# Patient Record
Sex: FEMALE | Race: WHITE | Employment: FULL TIME | URBAN - NONMETROPOLITAN AREA
[De-identification: names, ages, dates, MRNs, and addresses within clinical notes are randomized per-mention and may not be internally consistent; named-entity substitution may affect disease eponyms.]

---

## 2018-02-07 ENCOUNTER — HISTORY (OUTPATIENT)
Dept: RADIOLOGY | Facility: OTHER | Age: 48
End: 2018-02-07

## 2018-02-07 ENCOUNTER — HOSPITAL ENCOUNTER (OUTPATIENT)
Dept: RADIOLOGY | Facility: OTHER | Age: 48
End: 2018-02-07
Attending: PHYSICIAN ASSISTANT

## 2018-02-07 DIAGNOSIS — Z12.31 ENCOUNTER FOR SCREENING MAMMOGRAM FOR MALIGNANT NEOPLASM OF BREAST: ICD-10-CM

## 2018-02-09 ENCOUNTER — OFFICE VISIT - GICH (OUTPATIENT)
Dept: FAMILY MEDICINE | Facility: OTHER | Age: 48
End: 2018-02-09

## 2018-02-09 ENCOUNTER — HISTORY (OUTPATIENT)
Dept: FAMILY MEDICINE | Facility: OTHER | Age: 48
End: 2018-02-09

## 2018-02-09 ENCOUNTER — HOSPITAL ENCOUNTER (OUTPATIENT)
Dept: RADIOLOGY | Facility: OTHER | Age: 48
End: 2018-02-09
Attending: NURSE PRACTITIONER

## 2018-02-09 DIAGNOSIS — R03.0 ELEVATED BLOOD PRESSURE READING WITHOUT DIAGNOSIS OF HYPERTENSION: ICD-10-CM

## 2018-02-09 DIAGNOSIS — R06.02 SHORTNESS OF BREATH: ICD-10-CM

## 2018-02-09 DIAGNOSIS — R51.9 HEADACHE: ICD-10-CM

## 2018-02-09 DIAGNOSIS — F41.9 ANXIETY DISORDER: ICD-10-CM

## 2018-02-09 LAB
ABSOLUTE BASOPHILS - HISTORICAL: 0 THOU/CU MM
ABSOLUTE EOSINOPHILS - HISTORICAL: 0.1 THOU/CU MM
ABSOLUTE IMMATURE GRANULOCYTES(METAS,MYELOS,PROS) - HISTORICAL: 0 THOU/CU MM
ABSOLUTE LYMPHOCYTES - HISTORICAL: 1.4 THOU/CU MM (ref 0.9–2.9)
ABSOLUTE MONOCYTES - HISTORICAL: 0.7 THOU/CU MM
ABSOLUTE NEUTROPHILS - HISTORICAL: 3.1 THOU/CU MM (ref 1.7–7)
ANION GAP - HISTORICAL: 9 (ref 5–18)
BASOPHILS # BLD AUTO: 0.6 %
BUN SERPL-MCNC: 16 MG/DL (ref 7–25)
BUN/CREAT RATIO - HISTORICAL: 24
CALCIUM SERPL-MCNC: 8.7 MG/DL (ref 8.6–10.3)
CHLORIDE SERPLBLD-SCNC: 104 MMOL/L (ref 98–107)
CO2 SERPL-SCNC: 28 MMOL/L (ref 21–31)
CREAT SERPL-MCNC: 0.68 MG/DL (ref 0.7–1.3)
EOSINOPHIL NFR BLD AUTO: 2.4 %
ERYTHROCYTE [DISTWIDTH] IN BLOOD BY AUTOMATED COUNT: 12.2 % (ref 11.5–15.5)
GFR IF NOT AFRICAN AMERICAN - HISTORICAL: >60 ML/MIN/1.73M2
GLUCOSE SERPL-MCNC: 70 MG/DL (ref 70–105)
HCT VFR BLD AUTO: 39.4 % (ref 33–51)
HEMOGLOBIN: 13.7 G/DL (ref 12–16)
IMMATURE GRANULOCYTES(METAS,MYELOS,PROS) - HISTORICAL: 0 %
LYMPHOCYTES NFR BLD AUTO: 26.3 % (ref 20–44)
MCH RBC QN AUTO: 32.9 PG (ref 26–34)
MCHC RBC AUTO-ENTMCNC: 34.8 G/DL (ref 32–36)
MCV RBC AUTO: 95 FL (ref 80–100)
MONOCYTES NFR BLD AUTO: 12.8 %
NEUTROPHILS NFR BLD AUTO: 57.9 % (ref 42–72)
PLATELET # BLD AUTO: 222 THOU/CU MM (ref 140–440)
PMV BLD: 8.6 FL (ref 6.5–11)
POTASSIUM SERPL-SCNC: 3.4 MMOL/L (ref 3.5–5.1)
RED BLOOD COUNT - HISTORICAL: 4.17 MIL/CU MM (ref 4–5.2)
SODIUM SERPL-SCNC: 141 MMOL/L (ref 133–143)
TSH - HISTORICAL: 1.09 UIU/ML (ref 0.34–5.6)
WHITE BLOOD COUNT - HISTORICAL: 5.3 THOU/CU MM (ref 4.5–11)

## 2018-02-09 ASSESSMENT — ANXIETY QUESTIONNAIRES
2. NOT BEING ABLE TO STOP OR CONTROL WORRYING: SEVERAL DAYS
1. FEELING NERVOUS, ANXIOUS, OR ON EDGE: NOT AT ALL
GAD7 TOTAL SCORE: 10
7. FEELING AFRAID AS IF SOMETHING AWFUL MIGHT HAPPEN: MORE THAN HALF THE DAYS
4. TROUBLE RELAXING: NEARLY EVERY DAY
6. BECOMING EASILY ANNOYED OR IRRITABLE: NOT AT ALL
5. BEING SO RESTLESS THAT IT IS HARD TO SIT STILL: NEARLY EVERY DAY
3. WORRYING TOO MUCH ABOUT DIFFERENT THINGS: SEVERAL DAYS

## 2018-02-09 ASSESSMENT — PATIENT HEALTH QUESTIONNAIRE - PHQ9: SUM OF ALL RESPONSES TO PHQ QUESTIONS 1-9: 9

## 2018-02-12 VITALS
SYSTOLIC BLOOD PRESSURE: 134 MMHG | WEIGHT: 142.8 LBS | DIASTOLIC BLOOD PRESSURE: 90 MMHG | OXYGEN SATURATION: 99 % | TEMPERATURE: 98.3 F | HEART RATE: 96 BPM | BODY MASS INDEX: 24.99 KG/M2

## 2018-02-13 ENCOUNTER — NURSE TRIAGE (OUTPATIENT)
Dept: FAMILY MEDICINE | Facility: OTHER | Age: 48
End: 2018-02-13

## 2018-02-13 ENCOUNTER — TELEPHONE (OUTPATIENT)
Dept: FAMILY MEDICINE | Facility: OTHER | Age: 48
End: 2018-02-13

## 2018-02-13 ASSESSMENT — PATIENT HEALTH QUESTIONNAIRE - PHQ9: SUM OF ALL RESPONSES TO PHQ QUESTIONS 1-9: 9

## 2018-02-13 ASSESSMENT — ANXIETY QUESTIONNAIRES: GAD7 TOTAL SCORE: 10

## 2018-02-13 NOTE — PROGRESS NOTES
Patient Information     Patient Name MRN Sex Crystal Soriano [id number] Female 1970      Progress Notes by Sumaya De León R.T. ([facility] at 2018 10:43 AM     Author:  Sumaya De León R.T. ([facility]) Service:  (none) Author Type:  [employer] - Registered Radiologic Technologist     Filed:  2018 10:44 AM Date of Service:  2018 10:43 AM Status:  Signed     :  Sumaya De León R.T. ([facility]) ([facility] - Registered Radiologic Technologist)            Falls Risk Criteria:    Age 65 and older or under age 4        Sensory deficits    Poor vision    Use of ambulatory aides    Impaired judgment    Unable to walk independently    Meets High Risk criteria for falls:  no

## 2018-02-13 NOTE — PATIENT INSTRUCTIONS
Patient Information     Patient Name Crystal Palencia [id number] Female 1970      Patient Instructions by Pua Rashid NP at 2018  2:54 PM     Author:  Pau Rashid NP  Service:  (none) Author Type:  PHYS- Nurse Practitioner     Filed:  2018  2:54 PM  Encounter Date:  2018 Status:  Addendum     :  Pau Rashid NP (PHYS- Nurse Practitioner)        Related Notes: Original Note by Pau Rashid NP (PHYS- Nurse Practitioner) filed at 2018  2:54 PM            Lisinopril daily   Recheck with me in 2 weeks for labs  Paxil daily       Index Portuguese Related topics   Blood Pressure   ________________________________________________________________________  KEY POINTS    Blood pressure is the force of blood against artery walls as the heart pumps blood through the body.    Most people with high blood pressure have no symptoms.    If your blood pressure is too high, your healthcare provider may advise that you lose excess weight, use less salt in your food, be physically active, or take medicine.    If you have low blood pressure that is causing symptoms, do not skip meals, drink plenty of liquids, and stand up slowly after laying down.  ________________________________________________________________________  What is blood pressure?  Blood pressure is the force of blood against artery walls as the heart pumps blood through the body. Many people can improve their blood pressure or prevent high blood pressure with diet changes, more activity, and weight loss if needed. Even if you have a strong family history of high blood pressure, you can improve your blood pressure with a healthy lifestyle.  Blood pressure can go up briefly with exercise, stress, pain, or strong emotions. Drinking alcohol, using some drugs such as cocaine, or taking steroids, NSAIDs, or some cold medicines, can also raise blood pressure.  Blood pressure normally goes down when you are resting, sleeping, or  "feeling calm and relaxed. It can also go down if you are dehydrated and are not drinking enough liquids, such as if you are working or exercising in the hot sun.  How is blood pressure measured?  Most people with high blood pressure have no symptoms. The only way to find out if your blood pressure is normal, too high, or too low is to have it measured. Your healthcare provider measures blood pressure using an inflatable cuff around your upper arm and either a stethoscope or a machine that shows the result.    Low blood pressure usually means blood pressure that is lower than 90/60 or is low enough to cause symptoms. The first, upper number (90 in this example) is the pressure when the heart beats and pushes blood out to the rest of the body. The second, lower number (60 in this example) is the pressure when the heart rests between beats. Low blood pressure is less common than high blood pressure.    Normal resting blood pressure ranges up to 120/80 ( 120 over 80\").    Borderline high blood pressure is 120/80 or higher but less than 140/90.    High blood pressure is 140/90 or higher for most people. If you have diabetes or chronic kidney disease, 130/80 or higher is considered high blood pressure.  Why is high blood pressure a problem?  Over time, if your blood pressure rises and stays high, it can damage your blood vessels, heart, brain, kidneys, and eyes even though you may have no symptoms. The higher your blood pressure is, the more it increases your risk of heart attack, stroke, and other serious medical problems.  If you have high blood pressure, lowering it and keeping it normal can help prevent a heart attack or stroke. Keeping your blood pressure under control can help prevent long-term health problems as well, such as heart failure, kidney failure, and blindness.  How can I keep my blood pressure at the right level?  If your blood pressure is too high, your provider may recommend lifestyle changes to help " you lower your blood pressure, such as:    Lose excess weight. If you are overweight, losing even 10 pounds can lower your blood pressure.    Use less salt (sodium) in your food. Check the levels of sodium listed on food labels. Most of the sodium you eat may be hidden in processed foods such as chips, crackers, canned or boxed foods, fast food, or restaurant food.    Follow a healthy eating plan that is low in saturated fat, cholesterol, and processed foods. Include lots of fruits, vegetables, and fat-free or low-fat milk and milk products. Eat only enough calories to reach or keep a healthy weight. Ask your healthcare provider about how many calories you should eat each day.    Be physically active. Your provider can give you a physical activity plan that tells you what kind of activity and how much is safe for you.    Find ways to relax and to manage stress.    If you smoke, try to quit. Talk to your healthcare provider about ways to quit smoking. Smoking with high blood pressure raises the risk of heart attack and stroke.    If you want to drink alcohol, ask your healthcare provider how much is safe for you to drink.    Be careful with nonprescription medicines or herbal supplements. Some can raise blood pressure. This includes diet pills, cold and pain medicines, and energy drinks. Read labels or ask your pharmacist if the medicine or supplement affects blood pressure.    If lifestyle changes don t lower your blood pressure enough, your healthcare provider may prescribe one or more types of blood pressure medicine. Always follow your healthcare provider's instructions for taking medicine. Don't take more or less medicine or stop taking a medicine without talking to your provider first. It can be dangerous to stop taking certain blood pressure medicines suddenly.  If you have low blood pressure that is causing symptoms, after your healthcare provider has seen you, try these tips:    Don t skip meals. Eat a  healthy diet.    Avoid being out in the heat for a long time or being too active without drinking enough liquids.    Drink plenty of liquids every day, especially in hot weather or when outside.    If you have been lying down, sit for a moment before standing up, and then stand up slowly. Stand a moment before walking. Walk in place briefly while pulling in your stomach muscles several times. (This helps the return of blood flow from the legs.) Ask your healthcare provider if you should wear compression stockings.  If your blood pressure is normal, check it at least once a year. Your provider may recommend checking your blood pressure at home between checkups.  Developed by [employer].  Adult Advisor 2017.4 published by [employer].  Last modified: 2017-05-30  Last reviewed: 2017-05-08  This content is reviewed periodically and is subject to change as new health information becomes available. The information is intended to inform and educate and is not a replacement for medical evaluation, advice, diagnosis or treatment by a healthcare professional.  References   Adult Advisor 2017.4 Index    Copyright   2017 [employer], a division of [employer] All rights reserved.

## 2018-02-13 NOTE — TELEPHONE ENCOUNTER
Per face to face with Pau Rashid, Patient should stop the lisinopril, continue blood pressure monitoring and follow up in 1 week as planned. Attempted reaching Patient to notify her of this information. Left message to call back. Rachel Choudhary RN 2/13/2018 11:36 AM

## 2018-02-13 NOTE — TELEPHONE ENCOUNTER
"  Answer Assessment - Initial Assessment Questions  1. SYMPTOMS: \"Do you have any symptoms?\"  Patient called today stating when she takes her Lisinopril, her lips become slightly swollen and numb and the roof of her mouth feels numb as well.     Patient was prescribed Lisinopril on Friday for high blood pressure and took three times (F, Sa, Turner). She was also prescribed Paxil for anxiety. Patient took medications separately.    2. SEVERITY: If symptoms are present, ask \"Are they mild, moderate or severe?\"  Patient did not feel that symptoms were severe enough to take benadryl in case of allergy, but did not take the medication today. Patient is nervous about mixing ANY medications.    Rachel Choudhary RN 2/13/18 10:37 AM    Protocols used: MEDICATION QUESTION CALL-ADULT-    "

## 2018-02-13 NOTE — PROGRESS NOTES
Patient Information     Patient Name Crystal Palencia [id number] Female 1970      Progress Notes by Pau Rashid NP at 2018  1:30 PM     Author:  Pau Rashid NP Service:  (none) Author Type:  PHYS- Nurse Practitioner     Filed:  2018  4:16 PM Encounter Date:  2018 Status:  Signed     :  Pau Rashid NP (PHYS- Nurse Practitioner)            HPI:    Crystal Ferrara is a 47 y.o. female who presents to clinic today for blood pressure concerns. Reports she has been checking blood pressure at home and this has been running high. Reports readings up to 198/112. She has been checking blood pressures several times daily at times, usually in morning and at night. No hx of blood pressure. Feels this is causing headaches and SOB. She has had some pains in chest. Mother has HTN. Hx of smoking. She has changed some diet intake, avoiding fast food. Does have things like sausage, cottage cheese. She is doing exercise and cardio, approx 4 times a week. Reports her blood pressure concerns have been present for the past month. Hx of uterine ablation, no menses.     Having a lot of anxiety. She has a lot of stress at home. She is , when kids are gone she has increased anxiety. Her kids are 10, 11, 20.  for 7 years. No hx of medications for anxiety. She feels she is talking fast. Her co-workers have noticed this as well. Family hx of thyroid disease.     Past Medical History:     Diagnosis  Date     Heavy menses 2013     History of pregnancy      .      History of vaginal delivery      Past Surgical History:      Procedure  Laterality Date     APPENDECTOMY       TONSILLECTOMY       Social History        Substance Use Topics          Smoking status:   Former Smoker      Packs/day:  0.25      Years:  8.00      Types:  Cigarettes      Quit date:  2016      Smokeless tobacco:   Never Used      Alcohol use   Yes      Comment: once weekly       No current  outpatient prescriptions on file.     No current facility-administered medications for this visit.      Medications have been reviewed by me and are current to the best of my knowledge and ability.    Allergies     Allergen  Reactions     Amoxicillin Hives       ROS:  Pertinent positives and negatives are noted in HPI.    EXAM:  General appearance: well appearing female, in no acute distress  Head: normocephalic, atraumatic  Ears: TM's with cone of light, no erythema, canals clear bilaterally  Eyes: conjunctivae normal  Orophayrnx: moist mucous membranes, tonsils without erythema, exudates or petechiae, no post nasal drip seen  Neck: supple without adenopathy  Respiratory: clear to auscultation bilaterally  Cardiac: RRR with no murmurs  Psychological: normal affect, alert and pleasant  DANIA-7 ANXIETY SCREENING 2/9/2018   DANIA date (doc flow) 2/9/2018   Nervous, anxious 0   Cannot stop worrying 1   Worry about different things 1   Cannot relax 3   Feeling restless 3   Easily annoyed/irritated 0   Afraid of awful event 2   Score 10   Severity moderate anxiety   Some recent data might be hidden      EKG: NSR  CXR: normal  Lab:   Results for orders placed or performed in visit on 02/09/18      BASIC METABOLIC PANEL      Result  Value Ref Range    SODIUM 141 133 - 143 mmol/L    POTASSIUM 3.4 (L) 3.5 - 5.1 mmol/L    CHLORIDE 104 98 - 107 mmol/L    CO2,TOTAL 28 21 - 31 mmol/L    ANION GAP 9 5 - 18                    GLUCOSE 70 70 - 105 mg/dL    CALCIUM 8.7 8.6 - 10.3 mg/dL    BUN 16 7 - 25 mg/dL    CREATININE 0.68 (L) 0.70 - 1.30 mg/dL    BUN/CREAT RATIO           24                    GFR if African American >60 >60 ml/min/1.73m2    GFR if not African American >60 >60 ml/min/1.73m2   CBC WITH AUTO DIFFERENTIAL      Result  Value Ref Range    WHITE BLOOD COUNT         5.3 4.5 - 11.0 thou/cu mm    RED BLOOD COUNT           4.17 4.00 - 5.20 mil/cu mm    HEMOGLOBIN                13.7 12.0 - 16.0 g/dL    HEMATOCRIT                 39.4 33.0 - 51.0 %    MCV                       95 80 - 100 fL    MCH                       32.9 26.0 - 34.0 pg    MCHC                      34.8 32.0 - 36.0 g/dL    RDW                       12.2 11.5 - 15.5 %    PLATELET COUNT            222 140 - 440 thou/cu mm    MPV                       8.6 6.5 - 11.0 fL    NEUTROPHILS               57.9 42.0 - 72.0 %    LYMPHOCYTES               26.3 20.0 - 44.0 %    MONOCYTES                 12.8 (H) <12.0 %    EOSINOPHILS               2.4 <8.0 %    BASOPHILS                 0.6 <3.0 %    IMMATURE GRANULOCYTES(METAS,MYELOS,PROS) 0.0 %    ABSOLUTE NEUTROPHILS      3.1 1.7 - 7.0 thou/cu mm    ABSOLUTE LYMPHOCYTES      1.4 0.9 - 2.9 thou/cu mm    ABSOLUTE MONOCYTES        0.7 <0.9 thou/cu mm    ABSOLUTE EOSINOPHILS      0.1 <0.5 thou/cu mm    ABSOLUTE BASOPHILS        0.0 <0.3 thou/cu mm    ABSOLUTE IMMATURE GRANULOCYTES(METAS,MYELOS,PROS) 0.0 <=0.3 thou/cu mm         ASSESSMENT/PLAN:    ICD-10-CM    1. Nonintractable headache, unspecified chronicity pattern, unspecified headache type R51 BASIC METABOLIC PANEL      EKG 12 LEAD UNIT PERFORMED      XR CHEST 2 VIEWS PA AND LATERAL      CBC WITH DIFFERENTIAL      BASIC METABOLIC PANEL      CBC WITH DIFFERENTIAL      CBC WITH AUTO DIFFERENTIAL   2. Elevated blood pressure reading R03.0 BASIC METABOLIC PANEL      EKG 12 LEAD UNIT PERFORMED      XR CHEST 2 VIEWS PA AND LATERAL      CBC WITH DIFFERENTIAL      BASIC METABOLIC PANEL      CBC WITH DIFFERENTIAL      CBC WITH AUTO DIFFERENTIAL      NM ELECTROCARDIOGRAM TRACING      lisinopril (PRINIVIL; ZESTRIL) 10 mg tablet   3. Shortness of breath R06.02 BASIC METABOLIC PANEL      EKG 12 LEAD UNIT PERFORMED      XR CHEST 2 VIEWS PA AND LATERAL      CBC WITH DIFFERENTIAL      BASIC METABOLIC PANEL      CBC WITH DIFFERENTIAL      CBC WITH AUTO DIFFERENTIAL   4. Anxiety F41.9 PARoxetine (PAXIL) 10 mg tablet      TSH      TSH   BMP and CBC stable. CXR and EKG normal. TSH pending.  Lisinopril 10 mg daily. Will monitor BP at home 3-4 times weekly and f/u in 2 weeks for labs. Started on Paxil for anxiety. Discussed side effects which include but are not limited to headache, stomachache, sleep disturbance, appetite suppression, emotional lability. Also discussed black box warning on all SSRI medication for increased thoughts of suicidality or self harm in the initial phase of treatment. If any thoughts of self harm/suicide, family is asked to seek medical care or call 911 or First Call for Help/Crisis Team for evaluation.  Follow up for any new or concerning side effects or any other questions.     Patient Instructions   Lisinopril daily   Recheck with me in 2 weeks for labs  Paxil daily       Index [place]ian Related topics   Blood Pressure   ________________________________________________________________________  KEY POINTS    Blood pressure is the force of blood against artery walls as the heart pumps blood through the body.    Most people with high blood pressure have no symptoms.    If your blood pressure is too high, yourhealthcare provider may advise that you lose excess weight, use less salt in your food, be physically active, or take medicine.    If you have low blood pressure that is causing symptoms, do not skip meals, drink plenty of liquids, and stand up slowly after laying down.  ________________________________________________________________________  What is blood pressure?  Blood pressure is the force of blood against artery walls as the heart pumps blood through the body. Many people can improve their blood pressure or prevent high blood pressure with diet changes, more activity, and weight loss if needed. Even if you have a strong family history of high blood pressure, you can improve your blood pressure with a healthy lifestyle.  Blood pressure can go up briefly with exercise, stress, pain, or strong emotions. Drinking alcohol, using some drugs such as cocaine, or taking  "steroids, NSAIDs, or some cold medicines, can also raise blood pressure.  Blood pressure normally goes down when you are resting, sleeping, or feeling calm and relaxed. It can also go down if you are dehydrated and are not drinking enough liquids, such as if you are working or exercising in the hot sun.  How is blood pressure measured?  Most people with high blood pressure have no symptoms. The only way to find out if your blood pressure is normal, too high, or too low is to have it measured. Your healthcare provider measures blood pressure using an inflatable cuff around your upper arm and either a stethoscope or a machine that shows the result.    Low blood pressure usually means blood pressure that is lower than 90/60 or is low enough to cause symptoms. The first, upper number (90 in this example) is the pressure when the heart beats and pushes blood out to the rest of the body. The second, lower number (60 in this example) is the pressure when the heart rests between beats. Low blood pressure is less common than high blood pressure.    Normal resting blood pressure ranges up to 120/80 ( 120 over 80\").    Borderline high blood pressure is 120/80 or higher but less than 140/90.    High blood pressure is 140/90 or higher for most people. If you have diabetes or chronic kidney disease, 130/80 or higher is considered high blood pressure.  Why is high blood pressure a problem?  Over time, if your blood pressure rises and stays high, it can damage your blood vessels, heart, brain, kidneys, and eyes even though you may have no symptoms. The higher your blood pressure is, the more it increases your risk of heart attack, stroke, and other serious medical problems.  If you have high blood pressure, lowering it and keeping it normal can help prevent a heart attack or stroke. Keeping your blood pressure under control can help prevent long-term health problems as well, such as heart failure, kidney failure, and " blindness.  How can I keep my blood pressure at the right level?  If your blood pressure is too high, your provider may recommend lifestyle changes to help you lower your blood pressure, such as:    Lose excess weight. If you are overweight, losing even 10 pounds can lower your blood pressure.    Use less salt (sodium) in your food. Check the levels of sodium listed on food labels. Most of the sodium you eat may be hidden in processed foods such as chips, crackers, canned or boxed foods, fast food, or restaurant food.    Follow a healthy eating plan that is low in saturated fat, cholesterol, and processed foods. Include lots of fruits, vegetables, and fat-free or low-fat milk and milk products. Eat only enough calories to reach or keep a healthy weight. Ask your healthcare provider about how many calories you should eat each day.    Be physically active. Your provider can give you a physical activity plan that tells you what kind of activity and how much is safe for you.    Find ways to relax and to manage stress.    If you smoke, try to quit. Talk to your healthcare provider about ways to quit smoking. Smoking with high blood pressure raises the risk of heart attack and stroke.    If you want to drink alcohol, ask your healthcare provider how much is safe for you to drink.    Be careful with nonprescription medicines or herbal supplements. Some can raise blood pressure. This includes diet pills, cold and pain medicines, and energy drinks. Read labels or ask your pharmacist if the medicine or supplement affects blood pressure.    If lifestyle changes don t lower your blood pressure enough, your healthcare provider may prescribe one or more types of blood pressure medicine. Always follow your healthcare provider's instructions for taking medicine. Don't take more or less medicine or stop taking a medicine without talking to your provider first. It can be dangerous to stop taking certain blood pressure medicines  suddenly.  If you have low blood pressure that is causing symptoms, after your healthcare provider has seen you, try these tips:    Don t skip meals. Eat a healthy diet.    Avoid being out in the heat for a long time or being too active without drinking enough liquids.    Drink plenty of liquids every day, especially in hot weather or when outside.    If you have been lying down, sit for a moment before standing up, and then stand up slowly. Stand a moment before walking. Walk in place briefly while pulling in your stomach muscles several times. (This helps the return of blood flow from the legs.) Ask your healthcare provider if you should wear compression stockings.  If your blood pressure is normal, check it at least once a year. Your provider may recommend checking your blood pressure at home between checkups.  Developed by [employer].  Adult Advisor 2017.4 published by [employer].  Last modified: 2017-05-30  Last reviewed: 2017-05-08  This content is reviewed periodically and is subject to change as new health information becomes available. The information is intended to inform and educate and is not a replacement for medical evaluation, advice, diagnosis or treatment by a healthcare professional.  References   Adult Advisor 2017.4 Index    Copyright   2017 [employer], a division of [employer] All rights reserved.

## 2018-02-13 NOTE — NURSING NOTE
Patient Information     Patient Name MRN Crystal Bhatt [id number] Female 1970      Nursing Note by Key Stubbs at 2018  1:30 PM     Author:  Key Stubbs Service:  (none) Author Type:  (none)     Filed:  2018  1:47 PM Encounter Date:  2018 Status:  Signed     :  Key Stubbs            Patient states that her blood pressure is high.  Has been checking at home  Causing headaches and shortness breath  Key Stubbs LPN 2018 1:41 PM

## 2018-02-21 ENCOUNTER — DOCUMENTATION ONLY (OUTPATIENT)
Dept: FAMILY MEDICINE | Facility: OTHER | Age: 48
End: 2018-02-21

## 2018-02-21 RX ORDER — ALBUTEROL SULFATE 90 UG/1
2 AEROSOL, METERED RESPIRATORY (INHALATION) EVERY 4 HOURS PRN
COMMUNITY
Start: 2016-12-30 | End: 2018-02-23

## 2018-02-21 RX ORDER — AZITHROMYCIN 250 MG/1
TABLET, FILM COATED ORAL
COMMUNITY
Start: 2016-12-30 | End: 2018-02-23

## 2018-02-23 ENCOUNTER — OFFICE VISIT (OUTPATIENT)
Dept: FAMILY MEDICINE | Facility: OTHER | Age: 48
End: 2018-02-23
Attending: NURSE PRACTITIONER
Payer: COMMERCIAL

## 2018-02-23 VITALS
SYSTOLIC BLOOD PRESSURE: 152 MMHG | WEIGHT: 144 LBS | HEART RATE: 88 BPM | BODY MASS INDEX: 25.2 KG/M2 | DIASTOLIC BLOOD PRESSURE: 94 MMHG

## 2018-02-23 DIAGNOSIS — F41.9 ANXIETY: Primary | ICD-10-CM

## 2018-02-23 DIAGNOSIS — I10 HYPERTENSION, UNSPECIFIED TYPE: ICD-10-CM

## 2018-02-23 PROCEDURE — 99214 OFFICE O/P EST MOD 30 MIN: CPT | Performed by: NURSE PRACTITIONER

## 2018-02-23 RX ORDER — PAROXETINE 10 MG/1
10 TABLET, FILM COATED ORAL DAILY
COMMUNITY
Start: 2018-02-09 | End: 2018-03-23

## 2018-02-23 RX ORDER — PAROXETINE 20 MG/1
20 TABLET, FILM COATED ORAL AT BEDTIME
Qty: 30 TABLET | Refills: 1 | Status: SHIPPED | OUTPATIENT
Start: 2018-02-23 | End: 2018-03-27

## 2018-02-23 RX ORDER — CHLORTHALIDONE 25 MG/1
25 TABLET ORAL DAILY
Qty: 30 TABLET | Refills: 1 | Status: SHIPPED | OUTPATIENT
Start: 2018-02-23 | End: 2018-03-13

## 2018-02-23 ASSESSMENT — ANXIETY QUESTIONNAIRES
1. FEELING NERVOUS, ANXIOUS, OR ON EDGE: NOT AT ALL
3. WORRYING TOO MUCH ABOUT DIFFERENT THINGS: SEVERAL DAYS
IF YOU CHECKED OFF ANY PROBLEMS ON THIS QUESTIONNAIRE, HOW DIFFICULT HAVE THESE PROBLEMS MADE IT FOR YOU TO DO YOUR WORK, TAKE CARE OF THINGS AT HOME, OR GET ALONG WITH OTHER PEOPLE: VERY DIFFICULT
6. BECOMING EASILY ANNOYED OR IRRITABLE: SEVERAL DAYS
2. NOT BEING ABLE TO STOP OR CONTROL WORRYING: MORE THAN HALF THE DAYS
5. BEING SO RESTLESS THAT IT IS HARD TO SIT STILL: NEARLY EVERY DAY
7. FEELING AFRAID AS IF SOMETHING AWFUL MIGHT HAPPEN: NOT AT ALL
GAD7 TOTAL SCORE: 10

## 2018-02-23 ASSESSMENT — PATIENT HEALTH QUESTIONNAIRE - PHQ9: 5. POOR APPETITE OR OVEREATING: NEARLY EVERY DAY

## 2018-02-23 ASSESSMENT — PAIN SCALES - GENERAL: PAINLEVEL: NO PAIN (0)

## 2018-02-23 NOTE — PATIENT INSTRUCTIONS
Increase paxil to 20 mg daily  Chlorthalidone 25 mg daily  Continue to monitor your blood pressure a couple times weekly  Return in 1 month for recheck and lab work        Controlling High Blood Pressure  High blood pressure (hypertension) is often called the silent killer. This is because many people who have it don t know it. High blood pressure is defined as 140/90 mm Hg or higher. Know your blood pressure and remember to check it regularly. Doing so can save your life. Here are some things you can do to help control your blood pressure.    Choose heart-healthy foods    Select low-salt, low-fat foods. Limit sodium intake to 2,400 mg per day or the amount suggested by your healthcare provider.    Limit canned, dried, cured, packaged, and fast foods. These can contain a lot of salt.    Eat 8 to 10 servings of fruits and vegetables every day.    Choose lean meats, fish, or chicken.    Eat whole-grain pasta, brown rice, and beans.    Eat 2 to 3 servings of low-fat or fat-free dairy products.    Ask your doctor about the DASH eating plan. This plan helps reduce blood pressure.    When you go to a restaurant, ask that your meal be prepared with no added salt.  Maintain a healthy weight    Ask your healthcare provider how many calories to eat a day. Then stick to that number.    Ask your healthcare provider what weight range is healthiest for you. If you are overweight, a weight loss of only 3% to 5% of your body weight can help lower blood pressure. Generally, a good weight loss goal is to lose 10% of your body weight in a year.    Limit snacks and sweets.    Get regular exercise.  Get up and get active    Choose activities you enjoy. Find ones you can do with friends or family. This includes bicycling, dancing, walking, and jogging.    Park farther away from building entrances.    Use stairs instead of the elevator.    When you can, walk or bike instead of driving.    [place] leaves, garden, or do household  repairs.    Be active at a moderate to vigorous level of physical activity for at least 40 minutes for a minimum of 3 to 4 days a week.   Manage stress    Make time to relax and enjoy life. Find time to laugh.    Communicate your concerns with your loved ones and your healthcare provider.    Visit with family and friends, and keep up with hobbies.  Limit alcohol and quit smoking    Men should have no more than 2 drinks per day.    Women should have no more than 1 drink per day.    Talk with your healthcare provider about quitting smoking. Smoking significantly increases your risk for heart disease and stroke. Ask your healthcare provider about community smoking cessation programs and other options.  Medicines  If lifestyle changes aren t enough, your healthcare provider may prescribe high blood pressure medicine. Take all medicines as prescribed. If you have any questions about your medicines, ask your healthcare provider before stopping or changing them.   Date Last Reviewed: 4/27/2016 2000-2017 [employer]. [address] [place] [address], [place] [address]. All rights reserved. This information is not intended as a substitute for professional medical care. Always follow your healthcare professional's instructions.

## 2018-02-23 NOTE — NURSING NOTE
Patient presents to clinic today for a follow up on blood pressure and anxiety.    Kathy Diego LPN...................2/23/2018  10:07 AM

## 2018-02-23 NOTE — MR AVS SNAPSHOT
After Visit Summary   2/23/2018    Crystal Ferrara    MRN: [id number]           Patient Information     Date Of Birth          1970        Visit Information        Provider Department      2/23/2018 10:00 AM Pau Rashid APRN CNP [facility]        Today's Diagnoses     Anxiety    -  1    Hypertension, unspecified type          Care Instructions    Increase paxil to 20 mg daily  Chlorthalidone 25 mg daily  Continue to monitor your blood pressure a couple times weekly  Return in 1 month for recheck and lab work        Controlling High Blood Pressure  High blood pressure (hypertension) is often called the silent killer. This is because many people who have it don t know it. High blood pressure is defined as 140/90 mm Hg or higher. Know your blood pressure and remember to check it regularly. Doing so can save your life. Here are some things you can do to help control your blood pressure.    Choose heart-healthy foods    Select low-salt, low-fat foods. Limit sodium intake to 2,400 mg per day or the amount suggested by your healthcare provider.    Limit canned, dried, cured, packaged, and fast foods. These can contain a lot of salt.    Eat 8 to 10 servings of fruits and vegetables every day.    Choose lean meats, fish, or chicken.    Eat whole-grain pasta, brown rice, and beans.    Eat 2 to 3 servings of low-fat or fat-free dairy products.    Ask your doctor about the DASH eating plan. This plan helps reduce blood pressure.    When you go to a restaurant, ask that your meal be prepared with no added salt.  Maintain a healthy weight    Ask your healthcare provider how many calories to eat a day. Then stick to that number.    Ask your healthcare provider what weight range is healthiest for you. If you are overweight, a weight loss of only 3% to 5% of your body weight can help lower blood pressure. Generally, a good weight loss goal is to lose 10% of your body weight in a  year.    Limit snacks and sweets.    Get regular exercise.  Get up and get active    Choose activities you enjoy. Find ones you can do with friends or family. This includes bicycling, dancing, walking, and jogging.    Park farther away from building entrances.    Use stairs instead of the elevator.    When you can, walk or bike instead of driving.    [place] leaves, garden, or do household repairs.    Be active at a moderate to vigorous level of physical activity for at least 40 minutes for a minimum of 3 to 4 days a week.   Manage stress    Make time to relax and enjoy life. Find time to laugh.    Communicate your concerns with your loved ones and your healthcare provider.    Visit with family and friends, and keep up with hobbies.  Limit alcohol and quit smoking    Men should have no more than 2 drinks per day.    Women should have no more than 1 drink per day.    Talk with your healthcare provider about quitting smoking. Smoking significantly increases your risk for heart disease and stroke. Ask your healthcare provider about community smoking cessation programs and other options.  Medicines  If lifestyle changes aren t enough, your healthcare provider may prescribe high blood pressure medicine. Take all medicines as prescribed. If you have any questions about your medicines, ask your healthcare provider before stopping or changing them.   Date Last Reviewed: 4/27/2016 2000-2017 [employer]. [address] [place] [address], [place] [address]. All rights reserved. This information is not intended as a substitute for professional medical care. Always follow your healthcare professional's instructions.                Follow-ups after your visit        Future tests that were ordered for you today     Open Future Orders        Priority Expected Expires Ordered    Basic metabolic panel  (Ca, Cl, CO2, Creat, Gluc, K, Na, BUN) Routine 3/18/2018 2/23/2019 2/23/2018            Who to contact     If you have questions  "or need follow up information about today's clinic visit or your schedule please contact [facility] directly at [phone].  Normal or non-critical lab and imaging results will be communicated to you by My [employer], letter or phone within 4 business days after the clinic has received the results. If you do not hear from us within 7 days, please contact the clinic through [employer] or phone. If you have a critical or abnormal lab result, we will notify you by phone as soon as possible.  Submit refill requests through [employer] or call your pharmacy and they will forward the refill request to us. Please allow 3 business days for your refill to be completed.          Additional Information About Your Visit        My Health [employer]EPIS Information     [employer] lets you send messages to your doctor, view your test results, renew your prescriptions, schedule appointments and more. To sign up, go to [email or web address] . Click on \"Log in\" on the left side of the screen, which will take you to the Welcome page. Then click on \"Sign up Now\" on the right side of the page.     You will be asked to enter the access code listed below, as well as some personal information. Please follow the directions to create your username and password.     Your access code is: [email or web address]  Expires: 2018 10:24 AM     Your access code will  in 90 days. If you need help or a new code, please call your [place] clinic or [phone].        Care EveryWhere ID     This is your Care EveryWhere ID. This could be used by other organizations to access your [place] medical records  [id number]        Your Vitals Were     Pulse Breastfeeding? BMI (Body Mass Index)             88 No 25.2 kg/m2          Blood Pressure from Last 3 Encounters:   18 (!) 152/94   18 134/90   16 138/80    Weight from Last 3 Encounters:   18 144 lb (65.3 kg)   18 142 lb 12.8 oz (64.8 kg)   16 130 lb 4 oz (59.1 kg)            "      Today's Medication Changes          These changes are accurate as of 2/23/18 10:24 AM.  If you have any questions, ask your nurse or doctor.               Start taking these medicines.        Dose/Directions    chlorthalidone 25 MG tablet   Commonly known as:  HYGROTON   Used for:  Hypertension, unspecified type   Started by:  Pau Rashid APRN CNP        Dose:  25 mg   Take 1 tablet (25 mg) by mouth daily   Quantity:  30 tablet   Refills:  1         These medicines have changed or have updated prescriptions.        Dose/Directions    * PARoxetine 10 MG tablet   Commonly known as:  PAXIL   This may have changed:  Another medication with the same name was added. Make sure you understand how and when to take each.   Changed by:  Pau Rashid APRN CNP        Dose:  10 mg   Take 10 mg by mouth daily   Refills:  0       * PARoxetine 20 MG tablet   Commonly known as:  PAXIL   This may have changed:  You were already taking a medication with the same name, and this prescription was added. Make sure you understand how and when to take each.   Used for:  Anxiety   Changed by:  Pau Rashid APRN CNP        Dose:  20 mg   Take 1 tablet (20 mg) by mouth At Bedtime   Quantity:  30 tablet   Refills:  1       * Notice:  This list has 2 medication(s) that are the same as other medications prescribed for you. Read the directions carefully, and ask your doctor or other care provider to review them with you.         Where to get your medicines      These medications were sent to [facility] [address] [place], [place] - [address]  [address], [facility] [address]     Phone:  [phone]     chlorthalidone 25 MG tablet    PARoxetine 20 MG tablet                Primary Care Provider Fax #    Physician No Ref-Primary [phone]       No address on file        Equal Access to Services     MAXINE ORTEZ AH: shannan Hammond, nicolasa bernal  adalgisa escobararaseli gomez'aan ah. [facility] [phone].    ATENCIÓN: Si allison rouse, tiene a martinez disposición servicios gratuitos de asistencia lingüística. Blayne al [phone].    We comply with applicable federal civil rights laws and [place] laws. We do not discriminate on the basis of race, color, national origin, age, disability, sex, sexual orientation, or gender identity.            Thank you!     Thank you for choosing [facility]  for your care. Our goal is always to provide you with excellent care. Hearing back from our patients is one way we can continue to improve our services. Please take a few minutes to complete the written survey that you may receive in the mail after your visit with us. Thank you!             Your Updated Medication List - Protect others around you: Learn how to safely use, store and throw away your medicines at www.disposemymeds.org.          This list is accurate as of 2/23/18 10:24 AM.  Always use your most recent med list.                   Brand Name Dispense Instructions for use Diagnosis    chlorthalidone 25 MG tablet    HYGROTON    30 tablet    Take 1 tablet (25 mg) by mouth daily    Hypertension, unspecified type       * PARoxetine 10 MG tablet    PAXIL     Take 10 mg by mouth daily        * PARoxetine 20 MG tablet    PAXIL    30 tablet    Take 1 tablet (20 mg) by mouth At Bedtime    Anxiety       * Notice:  This list has 2 medication(s) that are the same as other medications prescribed for you. Read the directions carefully, and ask your doctor or other care provider to review them with you.

## 2018-02-24 ASSESSMENT — ANXIETY QUESTIONNAIRES: GAD7 TOTAL SCORE: 10

## 2018-02-24 ASSESSMENT — PATIENT HEALTH QUESTIONNAIRE - PHQ9: SUM OF ALL RESPONSES TO PHQ QUESTIONS 1-9: 8

## 2018-02-27 ENCOUNTER — DOCUMENTATION ONLY (OUTPATIENT)
Dept: FAMILY MEDICINE | Facility: OTHER | Age: 48
End: 2018-02-27

## 2018-03-05 ENCOUNTER — OFFICE VISIT (OUTPATIENT)
Dept: FAMILY MEDICINE | Facility: OTHER | Age: 48
End: 2018-03-05
Attending: NURSE PRACTITIONER
Payer: COMMERCIAL

## 2018-03-05 ENCOUNTER — TELEPHONE (OUTPATIENT)
Dept: FAMILY MEDICINE | Facility: OTHER | Age: 48
End: 2018-03-05

## 2018-03-05 VITALS
SYSTOLIC BLOOD PRESSURE: 102 MMHG | WEIGHT: 141.38 LBS | DIASTOLIC BLOOD PRESSURE: 60 MMHG | BODY MASS INDEX: 24.74 KG/M2 | HEART RATE: 100 BPM | TEMPERATURE: 97.6 F

## 2018-03-05 DIAGNOSIS — I10 ESSENTIAL HYPERTENSION: ICD-10-CM

## 2018-03-05 DIAGNOSIS — F41.9 ANXIETY: ICD-10-CM

## 2018-03-05 DIAGNOSIS — K12.0 CANKER SORES ORAL: Primary | ICD-10-CM

## 2018-03-05 PROCEDURE — 99214 OFFICE O/P EST MOD 30 MIN: CPT | Performed by: NURSE PRACTITIONER

## 2018-03-05 ASSESSMENT — PAIN SCALES - GENERAL: PAINLEVEL: EXTREME PAIN (9)

## 2018-03-05 NOTE — TELEPHONE ENCOUNTER
Notified patient of providers note. Sent to scheduling  Page Esparza CMA..............3/5/2018........8:58 AM

## 2018-03-05 NOTE — PATIENT INSTRUCTIONS
Understanding Canker Sores  Canker sores are small, painful sores inside the mouth. They occur most often on the tongue, gums, or insides of the cheeks. The medical term for canker sores is aphthous ulcers.  What causes a canker sore?  The exact cause of canker sores is not known, but they are linked to a number of conditions. These include:    An injury or irritation in the mouth, such as biting the inside of your cheek or braces rubbing    Allergy or sensitivity to certain foods or substances, such as citrus juice or some kinds of toothpaste    Poor nutrition    Emotional stress    Certain infections and illnesses  Canker sores tend to run in families.  What are the symptoms of a canker sore?  These are some common traits of canker sores:    Sores are open and grayish-yellow, surrounded by redness.    Sores are usually painful and sensitive to touch.    Canker sores may be preceded by a burning or tingling sensation a few hours to a few days before the sore appears.    Children and teens tend to get canker sores more often than adults.  How are canker sores treated?  Canker sores usually go away by themselves within 10 to 14 days. There is no cure for canker sores. Treatment focuses on relieving symptoms and shortening outbreaks. Treatments may include:    Prescription or over-the-counter skin treatments to apply to the sores. Steroids for your skin (topical) may protect the canker sores from further irritation and allow them to heal. Topical pain relief medicines may numb the area and make the sores less painful.    Certain types of toothpaste. These do not contain sodium lauryl sulfate. This type of toothpaste may prevent further aggravation of canker sores.    Oral prescription medicines. These are used for severe cases to help relieve symptoms.    Prescription or over-the-counter pain medicines. These help with discomfort.  What are the complications of a canker sore?  Mouth sores that seem to be canker  sores can be signs of a more serious illness. If you have other signs of illness along with mouth sores, you should talk with a healthcare provider. Canker sores can be so painful that they interfere with talking, eating, or drinking.  When should I call my healthcare provider?  Call your healthcare provider right away if you have any of these:    Canker sores that don t go away after 2 weeks    Canker sores that come back more than 3 times a year    Canker sores that are larger than about a half-inch across    Fever of 100.4 F (38 C) or higher, or as directed    Pain that gets worse    You aren t able to eat or drink because of painful sores    Symptoms that don t get better, or symptoms that get worse    New symptoms   Date Last Reviewed: 5/1/2016 2000-2017 [employer]. [address] [place] [address], [place] [address]. All rights reserved. This information is not intended as a substitute for professional medical care. Always follow your healthcare professional's instructions.

## 2018-03-05 NOTE — PROGRESS NOTES
HPI:    Crystal Ferrara is a 47 year old female who presents to clinic today for concerns of mouth sores. She was started on lisinopril and reports having swelling in her mouth so this was stopped and changed to chlorthalidone. This was started 2018. Calls to clinic today with concerns of mouth sores, worried this is an allergic reaction to the chlorthalidone. The mouth sores started 4 days ago. She was biting her cheeks on Friday and these have gone away. She reports her tongue and lip has sores to mouth. These sores are painful. Has been swishing with warm salt water. No recent cold sx, did note a cough during the night. She does have anxiety, being treated with Paxil 20 mg daily. Feels the paxil is helping her anxiety.     Past Medical History:   Diagnosis Date     Excessive and frequent menstruation with regular cycle     2013     Personal history of other medical treatment (CODE)     .     Personal history of other medical treatment (CODE)     No Comments Provided       Past Surgical History:   Procedure Laterality Date     APPENDECTOMY OPEN      No Comments Provided     TONSILLECTOMY      No Comments Provided       Current Outpatient Prescriptions   Medication Sig Dispense Refill     lidocaine, viscous, (XYLOCAINE) 2 % solution Take 15 mLs by mouth every 2 hours as needed for moderate pain swish and spit every 3-8 hours as needed; max 8 doses/24 hour period 100 mL 1     PARoxetine (PAXIL) 10 MG tablet Take 10 mg by mouth daily       PARoxetine (PAXIL) 20 MG tablet Take 1 tablet (20 mg) by mouth At Bedtime 30 tablet 1     chlorthalidone (HYGROTON) 25 MG tablet Take 1 tablet (25 mg) by mouth daily 30 tablet 1       Allergies   Allergen Reactions     Amoxicillin Hives       ROS:  Pertinent positives and negatives are noted in HPI.    EXAM:  General appearance: well appearing female, in no acute distress  Orophayrnx: moist mucous membranes, tonsils without erythema, exudates or petechiae, no post  nasal drip seen. Right side of tongue and lower buccal mucosa with canker sores.   Neck: supple without adenopathy  Respiratory: clear to auscultation bilaterally  Cardiac: RRR with no murmurs  Psychological: normal affect, alert and pleasant, continues with fast speech    PHQ Depression Screen  PHQ-9 SCORE 2/9/2018 2/23/2018   Total Score 9 8     Declines updating PHQ9 today.     ASSESSMENT AND PLAN:    1. Canker sores oral    2. Essential hypertension    3. Anxiety      Canker sores to mouth, on tongue and lower buccal mucosa. Tx with viscous lidocaine for pain management and encouraged continues use of salt water rinses. I doubt this is a reaction to her medications but will monitor and f/u if these do not resolve.     Her blood pressure is significantly improved today. She is very happy about this today. She will f/u in 2 weeks for recheck and lab work.     Her anxiety is improving per her report. She has been working on more self care lately which she feels is helping. Work is going well also. F/u in 2 weeks.       Pau Rashid..................3/5/2018 9:39 AM

## 2018-03-05 NOTE — TELEPHONE ENCOUNTER
Patient states that she has blisters in mouth that started on Friday. Patient thinks it's the Hygroton medication. Please advise? Would you like her to discontinue? She wanted to know your thoughts.  Pharmacy: [facility] ....................  3/5/2018   8:11 AM

## 2018-03-05 NOTE — NURSING NOTE
Patient presents to clinic today for sore in mouth starting this past Friday.     Declines PHQ    Kathy Diego LPN...................3/5/2018  9:40 AM

## 2018-03-05 NOTE — TELEPHONE ENCOUNTER
This would be a rare side effect. Likely not related. If she is concerned, should be seen. SALONI Pollard CNP on 3/5/2018 at 8:46 AM

## 2018-03-05 NOTE — MR AVS SNAPSHOT
After Visit Summary   3/5/2018    Crystal Ferrara    MRN: [id number]           Patient Information     Date Of Birth          1970        Visit Information        Provider Department      3/5/2018 9:30 AM Pau Rashid APRN CNP [facility]        Today's Diagnoses     Canker sores oral    -  1      Care Instructions      Understanding Canker Sores  Canker sores are small, painful sores inside the mouth. They occur most often on the tongue, gums, or insides of the cheeks. The medical term for canker sores is aphthous ulcers.  What causes a canker sore?  The exact cause of canker sores is not known, but they are linked to a number of conditions. These include:    An injury or irritation in the mouth, such as biting the inside of your cheek or braces rubbing    Allergy or sensitivity to certain foods or substances, such as citrus juice or some kinds of toothpaste    Poor nutrition    Emotional stress    Certain infections and illnesses  Canker sores tend to run in families.  What are the symptoms of a canker sore?  These are some common traits of canker sores:    Sores are open and grayish-yellow, surrounded by redness.    Sores are usually painful and sensitive to touch.    Canker sores may be preceded by a burning or tingling sensation a few hours to a few days before the sore appears.    Children and teens tend to get canker sores more often than adults.  How are canker sores treated?  Canker sores usually go away by themselves within 10 to 14 days. There is no cure for canker sores. Treatment focuses on relieving symptoms and shortening outbreaks. Treatments may include:    Prescription or over-the-counter skin treatments to apply to the sores. Steroids for your skin (topical) may protect the canker sores from further irritation and allow them to heal. Topical pain relief medicines may numb the area and make the sores less painful.    Certain types of toothpaste. These  do not contain sodium lauryl sulfate. This type of toothpaste may prevent further aggravation of canker sores.    Oral prescription medicines. These are used for severe cases to help relieve symptoms.    Prescription or over-the-counter pain medicines. These help with discomfort.  What are the complications of a canker sore?  Mouth sores that seem to be canker sores can be signs of a more serious illness. If you have other signs of illness along with mouth sores, you should talk with a healthcare provider. Canker sores can be so painful that they interfere with talking, eating, or drinking.  When should I call my healthcare provider?  Call your healthcare provider right away if you have any of these:    Canker sores that don t go away after 2 weeks    Canker sores that come back more than 3 times a year    Canker sores that are larger than about a half-inch across    Fever of 100.4 F (38 C) or higher, or as directed    Pain that gets worse    You aren t able to eat or drink because of painful sores    Symptoms that don t get better, or symptoms that get worse    New symptoms   Date Last Reviewed: 5/1/2016 2000-2017 [employer]. [address] [place] [address]. All rights reserved. This information is not intended as a substitute for professional medical care. Always follow your healthcare professional's instructions.                Follow-ups after your visit        Who to contact     If you have questions or need follow up information about today's clinic visit or your schedule please contact [facility] directly at [phone].  Normal or non-critical lab and imaging results will be communicated to you by MyChart, letter or phone within 4 business days after the clinic has received the results. If you do not hear from us within 7 days, please contact the clinic through MyChart or phone. If you have a critical or abnormal lab result, we will notify you by phone as  "soon as possible.  Submit refill requests through [employer] or call your pharmacy and they will forward the refill request to us. Please allow 3 business days for your refill to be completed.          Additional Information About Your Visit        [employer] Information     [employer] lets you send messages to your doctor, view your test results, renew your prescriptions, schedule appointments and more. To sign up, go to [email or web address] . Click on \"Log in\" on the left side of the screen, which will take you to the Welcome page. Then click on \"Sign up Now\" on the right side of the page.     You will be asked to enter the access code listed below, as well as some personal information. Please follow the directions to create your username and password.     Your access code is: [email or web address]  Expires: 2018 10:24 AM     Your access code will  in 90 days. If you need help or a new code, please call your [place] clinic or [phone].        Care EveryWhere ID     This is your Care EveryWhere ID. This could be used by other organizations to access your [place] medical records  [id number]        Your Vitals Were     Pulse Temperature BMI (Body Mass Index)             100 97.6  F (36.4  C) (Tympanic) 24.74 kg/m2          Blood Pressure from Last 3 Encounters:   18 102/60   18 (!) 152/94   18 134/90    Weight from Last 3 Encounters:   18 141 lb 6 oz (64.1 kg)   18 144 lb (65.3 kg)   18 142 lb 12.8 oz (64.8 kg)              Today, you had the following     No orders found for display         Today's Medication Changes          These changes are accurate as of 3/5/18  9:51 AM.  If you have any questions, ask your nurse or doctor.               Start taking these medicines.        Dose/Directions    lidocaine (viscous) 2 % solution   Commonly known as:  XYLOCAINE   Used for:  Canker sores oral   Started by:  Pau Rashid APRN CNP        Dose:  15 mL   Take 15 mLs by mouth " every 2 hours as needed for moderate pain swish and spit every 3-8 hours as needed; max 8 doses/24 hour period   Quantity:  100 mL   Refills:  1            Where to get your medicines      These medications were sent to [id number] - [place], [place] - [address]  [address], [place] [address]     Phone:  [phone]     lidocaine (viscous) 2 % solution                Primary Care Provider Fax #    Physician No Ref-Primary [phone]       No address on file        Equal Access to Services     [facility]: Hadii hilton danielson hadasho Soomaali, waaxda luqadaha, qaybta kaalmada joseyabrian, adalgisa garcia . So [facility] [phone].    ATENCIÓN: Si habla español, tiene a martinez disposición servicios gratuitos de asistencia lingüística. [facility] [phone].    We comply with applicable federal civil rights laws and [place] laws. We do not discriminate on the basis of race, color, national origin, age, disability, sex, sexual orientation, or gender identity.            Thank you!     Thank you for choosing [facility]  for your care. Our goal is always to provide you with excellent care. Hearing back from our patients is one way we can continue to improve our services. Please take a few minutes to complete the written survey that you may receive in the mail after your visit with us. Thank you!             Your Updated Medication List - Protect others around you: Learn how to safely use, store and throw away your medicines at www.disposemymeds.org.          This list is accurate as of 3/5/18  9:51 AM.  Always use your most recent med list.                   Brand Name Dispense Instructions for use Diagnosis    chlorthalidone 25 MG tablet    HYGROTON    30 tablet    Take 1 tablet (25 mg) by mouth daily    Hypertension, unspecified type       lidocaine (viscous) 2 % solution    XYLOCAINE    100 mL    Take 15 mLs by mouth every 2  hours as needed for moderate pain swish and spit every 3-8 hours as needed; max 8 doses/24 hour period    Canker sores oral       * PARoxetine 10 MG tablet    PAXIL     Take 10 mg by mouth daily        * PARoxetine 20 MG tablet    PAXIL    30 tablet    Take 1 tablet (20 mg) by mouth At Bedtime    Anxiety       * Notice:  This list has 2 medication(s) that are the same as other medications prescribed for you. Read the directions carefully, and ask your doctor or other care provider to review them with you.

## 2018-03-13 ENCOUNTER — TELEPHONE (OUTPATIENT)
Dept: FAMILY MEDICINE | Facility: OTHER | Age: 48
End: 2018-03-13

## 2018-03-13 ENCOUNTER — NURSE TRIAGE (OUTPATIENT)
Dept: FAMILY MEDICINE | Facility: OTHER | Age: 48
End: 2018-03-13

## 2018-03-13 DIAGNOSIS — I10 HYPERTENSION, UNSPECIFIED TYPE: Primary | ICD-10-CM

## 2018-03-13 RX ORDER — AMLODIPINE BESYLATE 5 MG/1
5 TABLET ORAL DAILY
Qty: 30 TABLET | Refills: 0 | OUTPATIENT
Start: 2018-03-13 | End: 2018-04-16

## 2018-03-13 NOTE — TELEPHONE ENCOUNTER
Please see triage encounter #[id number].    Rachel Choudhary RN .............. 3/13/2018  9:59 AM

## 2018-03-13 NOTE — TELEPHONE ENCOUNTER
"PLEASE REVIEW AND ADVISE: THANK YOU!    Per telephone note today, \"Patient is having problems with chlorthalidone, just like the lisinopril. She is still having blisters in her mouth, so she stopped taking it last Wednesday. She checked her blood pressure last night and it was 203/117. She is wondering if there would be another medication option or if Pau Rashid would like her to come in for an appointment.    Upon chart review, Patient was seen by Pau Rashid on 3/5/18 for canker sores and hypertension. It was noted that it was doubtful reaction was related to medications and Patient was to follow up in 2 weeks. Patient has appointment scheduled on 3/19.    Called and spoke to Patient after verifying last name and date of birth. Patient states she normally doesn't have blood pressure this high, and it is starting to \"freak her out.\" She can \"feel it coming on, feels nauseous at times and looks pale and feels like she could pass out\" when her BP is elevated. She has been \"trying to relax, reduce stress, manage time better.\"    Patient states she is in [place] right now, but when she gets home, she will plan to check her blood pressure and get 2-3 readings approx. 5 minutes apart. She also states she has a couple of medications written down at home that her Mother has had success with for her own HTN that she would like to ask about. She has been keeping a ledger of when she takes her BP medication. Patient plans to call on her lunch hour with her BP readings. Meanwhile, writer will send message to Pau Rashid for consideration.    Rachel Choudhary RN .............. 3/13/2018  9:56 AM    "

## 2018-03-13 NOTE — TELEPHONE ENCOUNTER
It is very unlikely that the mouth sores are related to the medications. This is likely a viral issue that may come and go. I will change her blood pressure medication to a different class though. Amlodipine 5 mg daily. She really needs to continue on something consistently to manage her blood pressure. Please have her f/u in 1-2 weeks of being on new medication. Does not need to follow up this week unless she has other concerns. SALONI Pollard CNP on 3/13/2018 at 12:27 PM

## 2018-03-13 NOTE — TELEPHONE ENCOUNTER
Returned call to patient and verified last name and date of birth. Patient is having problems with chlorthalidone, just like the lisinopril. She is still having blisters in her mouth, so she stopped taking it last Wednesday. She checked her blood pressure last night and it was 203/117. What would be another mediation option? Would you like her to come in for an appointment? Please advise    Es Varghese LPN on 3/13/2018 at 9:28 AM

## 2018-03-13 NOTE — TELEPHONE ENCOUNTER
"Patient returned call and was notified of this information. Patient checked her blood pressure today and the readings were:    11:22 AM  177/107, P: 89  12:11 PM  182/101,  P: 96     178/107,  P: 103    Patient added that she \"feels yucky.\" The medications her mother (who has had \"extremely high BP most of her life\") has had success with include: lopressor, HCTZ, verapamil. When her mother was first diagnosed around Patient's age, she was prescribed tenormin. Patient thinks she may be sensitive due to being a red-head and rarely taking medication. She adds that since stopping the lisinopril, her sores inside her cheek have improved and the HCTZ made her feel like her throat was closing up.    Patient verbalized agreement with plan to start new medication (Amlodipine 5 mg daily), check BP periodically and follow up in 1-2 weeks.    Rachel Choudhary RN .............. 3/13/2018  1:40 PM      "

## 2018-03-14 ENCOUNTER — TELEPHONE (OUTPATIENT)
Dept: FAMILY MEDICINE | Facility: OTHER | Age: 48
End: 2018-03-14

## 2018-03-14 NOTE — TELEPHONE ENCOUNTER
Patient called and last name and  were verified. Patient is calling in regards to the following order:    amLODIPine (NORVASC) 5 MG tablet 30 tablet 0 3/13/2018  --   Sig: Take 1 tablet (5 mg) by mouth daily   Class: E-Prescribe   Route: Oral   Order: [id number]     [id number] - [place], [place] - [address]    Patient states she stopped to  [facility] Called [facility] and spoke with pharmacist, Timbo, after verifying Patient's last name and . Verbal order given.    Rachel Choudhary RN .............. 3/14/2018  3:28 PM

## 2018-03-14 NOTE — TELEPHONE ENCOUNTER
Hiwot Choudhary RN has resolved the issue  Linda Roque RN.............................3/14/2018 1:03 PM

## 2018-03-23 ENCOUNTER — HOSPITAL ENCOUNTER (EMERGENCY)
Facility: OTHER | Age: 48
Discharge: HOME | End: 2018-03-23
Attending: EMERGENCY MEDICINE | Admitting: EMERGENCY MEDICINE
Payer: COMMERCIAL

## 2018-03-23 ENCOUNTER — OFFICE VISIT (OUTPATIENT)
Dept: FAMILY MEDICINE | Facility: OTHER | Age: 48
End: 2018-03-23
Attending: NURSE PRACTITIONER
Payer: COMMERCIAL

## 2018-03-23 VITALS
OXYGEN SATURATION: 99 % | TEMPERATURE: 98.6 F | DIASTOLIC BLOOD PRESSURE: 85 MMHG | HEART RATE: 117 BPM | SYSTOLIC BLOOD PRESSURE: 125 MMHG | RESPIRATION RATE: 18 BRPM

## 2018-03-23 DIAGNOSIS — K12.0 APHTHOUS ULCERATION: ICD-10-CM

## 2018-03-23 DIAGNOSIS — T43.225A ADVERSE EFFECT OF SELECTIVE SEROTONIN REUPTAKE INHIBITOR (SSRI), INITIAL ENCOUNTER: ICD-10-CM

## 2018-03-23 DIAGNOSIS — Z53.9 ERRONEOUS ENCOUNTER--DISREGARD: Primary | ICD-10-CM

## 2018-03-23 LAB
ALBUMIN SERPL-MCNC: 4.1 G/DL (ref 3.5–5.7)
ALP SERPL-CCNC: 84 U/L (ref 34–104)
ALT SERPL W P-5'-P-CCNC: 30 U/L (ref 7–52)
ANION GAP SERPL CALCULATED.3IONS-SCNC: 11 MMOL/L (ref 3–14)
AST SERPL W P-5'-P-CCNC: 47 U/L (ref 13–39)
BASOPHILS # BLD AUTO: 0 10E9/L (ref 0–0.2)
BASOPHILS NFR BLD AUTO: 0.7 %
BILIRUB SERPL-MCNC: 0.3 MG/DL (ref 0.3–1)
BUN SERPL-MCNC: 11 MG/DL (ref 7–25)
CALCIUM SERPL-MCNC: 8.9 MG/DL (ref 8.6–10.3)
CHLORIDE SERPL-SCNC: 99 MMOL/L (ref 98–107)
CO2 SERPL-SCNC: 26 MMOL/L (ref 21–31)
CREAT SERPL-MCNC: 0.64 MG/DL (ref 0.6–1.2)
DEPRECATED S PYO AG THROAT QL EIA: NORMAL
DIFFERENTIAL METHOD BLD: ABNORMAL
EOSINOPHIL # BLD AUTO: 0.1 10E9/L (ref 0–0.7)
EOSINOPHIL NFR BLD AUTO: 1.6 %
ERYTHROCYTE [DISTWIDTH] IN BLOOD BY AUTOMATED COUNT: 13.9 % (ref 10–15)
GFR SERPL CREATININE-BSD FRML MDRD: >90 ML/MIN/1.7M2
GLUCOSE SERPL-MCNC: 109 MG/DL (ref 70–105)
HCT VFR BLD AUTO: 36.6 % (ref 35–47)
HGB BLD-MCNC: 13.3 G/DL (ref 11.7–15.7)
IMM GRANULOCYTES # BLD: 0 10E9/L (ref 0–0.4)
IMM GRANULOCYTES NFR BLD: 0.2 %
LYMPHOCYTES # BLD AUTO: 0.9 10E9/L (ref 0.8–5.3)
LYMPHOCYTES NFR BLD AUTO: 20.2 %
MCH RBC QN AUTO: 34.2 PG (ref 26.5–33)
MCHC RBC AUTO-ENTMCNC: 36.3 G/DL (ref 31.5–36.5)
MCV RBC AUTO: 94 FL (ref 78–100)
MONOCYTES # BLD AUTO: 0.5 10E9/L (ref 0–1.3)
MONOCYTES NFR BLD AUTO: 12 %
NEUTROPHILS # BLD AUTO: 3 10E9/L (ref 1.6–8.3)
NEUTROPHILS NFR BLD AUTO: 65.3 %
PLATELET # BLD AUTO: 197 10E9/L (ref 150–450)
POTASSIUM SERPL-SCNC: 3.4 MMOL/L (ref 3.5–5.1)
PROT SERPL-MCNC: 6.9 G/DL (ref 6.4–8.9)
RBC # BLD AUTO: 3.89 10E12/L (ref 3.8–5.2)
SODIUM SERPL-SCNC: 136 MMOL/L (ref 134–144)
SPECIMEN SOURCE: NORMAL
TSH SERPL DL<=0.05 MIU/L-ACNC: 1.85 IU/ML (ref 0.34–5.6)
WBC # BLD AUTO: 4.5 10E9/L (ref 4–11)

## 2018-03-23 PROCEDURE — 85025 COMPLETE CBC W/AUTO DIFF WBC: CPT | Performed by: EMERGENCY MEDICINE

## 2018-03-23 PROCEDURE — 36415 COLL VENOUS BLD VENIPUNCTURE: CPT | Performed by: EMERGENCY MEDICINE

## 2018-03-23 PROCEDURE — 99283 EMERGENCY DEPT VISIT LOW MDM: CPT | Performed by: EMERGENCY MEDICINE

## 2018-03-23 PROCEDURE — 80053 COMPREHEN METABOLIC PANEL: CPT | Performed by: EMERGENCY MEDICINE

## 2018-03-23 PROCEDURE — 25000132 ZZH RX MED GY IP 250 OP 250 PS 637: Performed by: EMERGENCY MEDICINE

## 2018-03-23 PROCEDURE — 87880 STREP A ASSAY W/OPTIC: CPT | Performed by: EMERGENCY MEDICINE

## 2018-03-23 PROCEDURE — 84443 ASSAY THYROID STIM HORMONE: CPT | Performed by: EMERGENCY MEDICINE

## 2018-03-23 PROCEDURE — 99283 EMERGENCY DEPT VISIT LOW MDM: CPT | Mod: Z6 | Performed by: EMERGENCY MEDICINE

## 2018-03-23 RX ORDER — DIPHENHYDRAMINE HCL 25 MG
50 CAPSULE ORAL ONCE
Status: COMPLETED | OUTPATIENT
Start: 2018-03-23 | End: 2018-03-23

## 2018-03-23 RX ADMIN — DIPHENHYDRAMINE HYDROCHLORIDE 50 MG: 25 CAPSULE ORAL at 15:43

## 2018-03-23 RX ADMIN — IBUPROFEN 600 MG: 200 TABLET, FILM COATED ORAL at 16:15

## 2018-03-23 ASSESSMENT — ENCOUNTER SYMPTOMS
DYSURIA: 0
PALPITATIONS: 0
TROUBLE SWALLOWING: 1
CHEST TIGHTNESS: 0
SHORTNESS OF BREATH: 0
SORE THROAT: 1
VOMITING: 0
ARTHRALGIAS: 0
FEVER: 0
NAUSEA: 0
HEADACHES: 0
CHILLS: 0
AGITATION: 0
FACIAL SWELLING: 0

## 2018-03-23 NOTE — ED NOTES
Pt states she has been taking amlodipine for 1 week with throat swelling since Wednesday.  Pt states she has not taken any benadryl for the throat swelling.  Is talking rampantly in the triage room without any loss in breathing.  Throat examination revealed a yellow ulcer on the right side of tongue and in the left upper lip.  Pt feels that she has been biting her lip.      Pt states she had throat swelling with previous medications most recently lisinopril was prescribed viscous lidocaine for that per pt.

## 2018-03-23 NOTE — PROGRESS NOTES
Pt c/o swelling in throat with possible blistering. Pt informs this RN that she thinks it could be from her b/p medication. Pt saw Dr. Garcia and was told to be seen in the ED.

## 2018-03-23 NOTE — ED AVS SNAPSHOT
[facility]    [address]    [place] [address]    Phone:  [phone]    Fax:  [phone]                                       Crystal Ferrara   MRN: [id number]    Department:  [facility]   Date of Visit:  3/23/2018           After Visit Summary Signature Page     I have received my discharge instructions, and my questions have been answered. I have discussed any challenges I see with this plan with the nurse or doctor.    ..........................................................................................................................................  Patient/Patient Representative Signature      ..........................................................................................................................................  Patient Representative Print Name and Relationship to Patient    ..................................................               ................................................  Date                                            Time    ..........................................................................................................................................  Reviewed by Signature/Title    ...................................................              ..............................................  Date                                                            Time

## 2018-03-23 NOTE — MR AVS SNAPSHOT
"              After Visit Summary   3/23/2018    Crystal Ferrara    MRN: [id number]           Patient Information     Date Of Birth          1970        Visit Information        Provider Department      3/23/2018 2:30 PM Pau Rashid APRN CNP [facility]        Today's Diagnoses     ERRONEOUS ENCOUNTER--DISREGARD    -  1       Follow-ups after your visit        Who to contact     If you have questions or need follow up information about today's clinic visit or your schedule please contact [facility] directly at [phone].  Normal or non-critical lab and imaging results will be communicated to you by [employer], letter or phone within 4 business days after the clinic has received the results. If you do not hear from us within 7 days, please contact the clinic through [employer] or phone. If you have a critical or abnormal lab result, we will notify you by phone as soon as possible.  Submit refill requests through [employer] or call your pharmacy and they will forward the refill request to us. Please allow 3 business days for your refill to be completed.          Additional Information About Your Visit        MyChart Information     [employer] lets you send messages to your doctor, view your test results, renew your prescriptions, schedule appointments and more. To sign up, go to [email or web address] . Click on \"Log in\" on the left side of the screen, which will take you to the Welcome page. Then click on \"Sign up Now\" on the right side of the page.     You will be asked to enter the access code listed below, as well as some personal information. Please follow the directions to create your username and password.     Your access code is: [email or web address]  Expires: 2018 11:24 AM     Your access code will  in 90 days. If you need help or a new code, please call your [place] clinic or [phone].        Care EveryWhere ID     This is your Care EveryWhere ID. This " could be used by other organizations to access your [place] medical records  [id number]         Blood Pressure from Last 3 Encounters:   03/23/18 125/85   03/05/18 102/60   02/23/18 (!) 152/94    Weight from Last 3 Encounters:   03/05/18 141 lb 6 oz (64.1 kg)   02/23/18 144 lb (65.3 kg)   02/09/18 142 lb 12.8 oz (64.8 kg)              Today, you had the following     No orders found for display       Primary Care Provider Fax #    Physician No Ref-Primary [phone]       No address on file        Equal Access to Services     [facility] : Hadii hilton Chavez, watiesha soriano, miltonybangie kaalmada luz, adalgisa garcia . So [facility] [phone].    ATENCIÓN: Si habla español, tiene a martinez disposición servicios gratuitos de asistencia lingüística. Llame al [phone].    We comply with applicable federal civil rights laws and [place] laws. We do not discriminate on the basis of race, color, national origin, age, disability, sex, sexual orientation, or gender identity.            Thank you!     Thank you for choosing [facility] hospitals  for your care. Our goal is always to provide you with excellent care. Hearing back from our patients is one way we can continue to improve our services. Please take a few minutes to complete the written survey that you may receive in the mail after your visit with us. Thank you!             Your Updated Medication List - Protect others around you: Learn how to safely use, store and throw away your medicines at www.disposemymeds.org.          This list is accurate as of 3/23/18  3:51 PM.  Always use your most recent med list.                   Brand Name Dispense Instructions for use Diagnosis    amLODIPine 5 MG tablet    NORVASC    30 tablet    Take 1 tablet (5 mg) by mouth daily    Hypertension, unspecified type       PARoxetine 20 MG tablet    PAXIL    30 tablet    Take 1 tablet (20 mg) by mouth At Bedtime    Anxiety

## 2018-03-23 NOTE — ED AVS SNAPSHOT
[facility]    [address]    [place] [address]    Phone:  [phone]    Fax:  [phone]                                       Crystal Ferrara   MRN: [id number]    Department:  [facility]   Date of Visit:  3/23/2018           Patient Information     Date Of Birth          1970        Your diagnoses for this visit were:     Aphthous ulceration     Adverse effect of selective serotonin reuptake inhibitor (SSRI), initial encounter        You were seen by Haseeb Snider MD.      24 Hour Appointment Hotline       To make an appointment at any [facility], call [phone] ([phone]). If you don't have a family doctor or clinic, we will help you find one. [facility] clinics are conveniently located to serve the needs of you and your family.             Review of your medicines      Our records show that you are taking the medicines listed below. If these are incorrect, please call your family doctor or clinic.        Dose / Directions Last dose taken    amLODIPine 5 MG tablet   Commonly known as:  NORVASC   Dose:  5 mg   Quantity:  30 tablet        Take 1 tablet (5 mg) by mouth daily   Refills:  0        PARoxetine 20 MG tablet   Commonly known as:  PAXIL   Dose:  20 mg   Quantity:  30 tablet        Take 1 tablet (20 mg) by mouth At Bedtime   Refills:  1                Procedures and tests performed during your visit     CBC with platelets differential    Comprehensive metabolic panel    Rapid strep screen    Thyrotropin GH      Orders Needing Specimen Collection     None      Pending Results     No orders found from 3/21/2018 to 3/24/2018.            Pending Culture Results     No orders found from 3/21/2018 to 3/24/2018.            Thank you for choosing [facility]       Thank you for choosing [facility] for your care. Our goal is always to provide you with excellent care. Hearing back from our patients is one way we can continue to improve  "our services. Please take a few minutes to complete the written survey that you may receive in the mail after you visit with us. Thank you!        [employer] Information     [employer] lets you send messages to your doctor, view your test results, renew your prescriptions, schedule appointments and more. To sign up, go to [email or web address] . Click on \"Log in\" on the left side of the screen, which will take you to the Welcome page. Then click on \"Sign up Now\" on the right side of the page.     You will be asked to enter the access code listed below, as well as some personal information. Please follow the directions to create your username and password.     Your access code is: [email or web address]  Expires: 2018 11:24 AM     Your access code will  in 90 days. If you need help or a new code, please call your [place] clinic or [phone].        Care EveryWhere ID     This is your Care EveryWhere ID. This could be used by other organizations to access your [place] medical records  [id number]        Equal Access to Services     [facility] : Hadii hilton Chavez, wadarienda bo, qaybangie kaalerick escobar, adalgisa garcia . So [facility] [phone].    ATENCIÓN: Si habla español, tiene a martinez disposición servicios gratuitos de asistencia lingüística. Blayne al [phone].    We comply with applicable federal civil rights laws and [place] laws. We do not discriminate on the basis of race, color, national origin, age, disability, sex, sexual orientation, or gender identity.            After Visit Summary       This is your record. Keep this with you and show to your community pharmacist(s) and doctor(s) at your next visit.                  "

## 2018-03-23 NOTE — ED PROVIDER NOTES
History     Chief Complaint   Patient presents with     Oral Swelling     The history is provided by the patient.     Crystal Ferrara is a 47 year old female who is here complaining of pain and swelling in her throat for the last 2 days. She is recently started on a couple of new medications including Paxil and amlodipine. She had initially gone in for her blood pressure but they were concerned about some anxiety so also started the Paxil. In addition to the sore throat, and she also mentions blisters underneath the right side of her tongue and her throat, she says she has not been sleeping well. She has somewhat rapid and pressured speech. Apparently when she falls asleep she will be twitching much more than normal. Initially she said she was not really sleeping much at all but then later she said she was getting some sleep. Has not been febrile. No muscle cramps or aches. No confusion. Does not feel that she is becoming ill with other types of URI symptoms. Family is not ill.    Problem List:    Patient Active Problem List    Diagnosis Date Noted     Essential hypertension 03/05/2018     Priority: Medium     Anxiety 03/05/2018     Priority: Medium     Dysmenorrhea 06/12/2013     Priority: Medium     Heavy menses 05/01/2013     Priority: Medium     Iron deficiency anemia due to chronic blood loss 05/01/2013     Priority: Medium        Past Medical History:    Past Medical History:   Diagnosis Date     Excessive and frequent menstruation with regular cycle      Personal history of other medical treatment (CODE)      Personal history of other medical treatment (CODE)        Past Surgical History:    Past Surgical History:   Procedure Laterality Date     APPENDECTOMY OPEN      No Comments Provided     TONSILLECTOMY      No Comments Provided       Family History:    Family History   Problem Relation Age of Onset     Family History Negative Mother      Good Health     Hypertension Mother      Hypertension     Family  History Negative Father      Good Health     Family History Negative Sister      Good Health     Family History Negative Son      Good Health     Family History Negative Daughter      Good Health     Breast Cancer Maternal Aunt      Cancer-breast     Breast Cancer Paternal Aunt      Cancer-breast       Social History:  Marital Status:   [2]  Social History   Substance Use Topics     Smoking status: Current Some Day Smoker     Packs/day: 0.25     Years: 8.00     Types: Cigarettes     Last attempt to quit: 9/1/2016     Smokeless tobacco: Never Used     Alcohol use Yes      Comment: Alcoholic Drinks/day: once weekly        Medications:      amLODIPine (NORVASC) 5 MG tablet   PARoxetine (PAXIL) 20 MG tablet   [DISCONTINUED] PARoxetine (PAXIL) 10 MG tablet         Review of Systems   Constitutional: Negative for chills and fever.   HENT: Positive for mouth sores, sore throat and trouble swallowing. Negative for congestion and facial swelling.    Eyes: Negative for visual disturbance.   Respiratory: Negative for chest tightness and shortness of breath.    Cardiovascular: Negative for chest pain and palpitations.   Gastrointestinal: Negative for nausea and vomiting.   Genitourinary: Negative for dysuria.   Musculoskeletal: Negative for arthralgias.   Skin: Negative for rash.   Neurological: Negative for headaches.   Psychiatric/Behavioral: Negative for agitation.       Physical Exam   BP: 125/85  Pulse: 117  Temp: 98.6  F (37  C)  Resp: 18      Physical Exam   Constitutional: She is oriented to person, place, and time. She appears well-developed and well-nourished. No distress.   HENT:   Head: Normocephalic and atraumatic.   She does have what looks like a fairly large aphthous ulcer under the right side of her tongue. Posterior pharynx is erythematous but not swollen. I do not see any lesions there.   Eyes: Conjunctivae are normal.   Neck: Neck supple.   Cardiovascular: Normal rate and regular rhythm.     Pulmonary/Chest: Effort normal and breath sounds normal.   Abdominal: Soft. Bowel sounds are normal.   Neurological: She is alert and oriented to person, place, and time.   She has very brisk patellar deep tendon reflexes and a strong one beat clonus in her ankles.   Skin: Skin is warm and dry. She is not diaphoretic.   Psychiatric: She has a normal mood and affect. Her behavior is normal.   Speech may be slightly pressured and rapid.   Nursing note and vitals reviewed.      ED Course     ED Course     Procedures               Results for orders placed or performed during the hospital encounter of 03/23/18 (from the past 24 hour(s))   CBC with platelets differential   Result Value Ref Range    WBC 4.5 4.0 - 11.0 10e9/L    RBC Count 3.89 3.8 - 5.2 10e12/L    Hemoglobin 13.3 11.7 - 15.7 g/dL    Hematocrit 36.6 35.0 - 47.0 %    MCV 94 78 - 100 fl    MCH 34.2 (H) 26.5 - 33.0 pg    MCHC 36.3 31.5 - 36.5 g/dL    RDW 13.9 10.0 - 15.0 %    Platelet Count 197 150 - 450 10e9/L    Diff Method Automated Method     % Neutrophils 65.3 %    % Lymphocytes 20.2 %    % Monocytes 12.0 %    % Eosinophils 1.6 %    % Basophils 0.7 %    % Immature Granulocytes 0.2 %    Absolute Neutrophil 3.0 1.6 - 8.3 10e9/L    Absolute Lymphocytes 0.9 0.8 - 5.3 10e9/L    Absolute Monocytes 0.5 0.0 - 1.3 10e9/L    Absolute Eosinophils 0.1 0.0 - 0.7 10e9/L    Absolute Basophils 0.0 0.0 - 0.2 10e9/L    Abs Immature Granulocytes 0.0 0 - 0.4 10e9/L   Comprehensive metabolic panel   Result Value Ref Range    Sodium 136 134 - 144 mmol/L    Potassium 3.4 (L) 3.5 - 5.1 mmol/L    Chloride 99 98 - 107 mmol/L    Carbon Dioxide 26 21 - 31 mmol/L    Anion Gap 11 3 - 14 mmol/L    Glucose 109 (H) 70 - 105 mg/dL    Urea Nitrogen 11 7 - 25 mg/dL    Creatinine 0.64 0.60 - 1.20 mg/dL    GFR Estimate >90 >60 mL/min/1.7m2    GFR Estimate If Black >90 >60 mL/min/1.7m2    Calcium 8.9 8.6 - 10.3 mg/dL    Bilirubin Total 0.3 0.3 - 1.0 mg/dL    Albumin 4.1 3.5 - 5.7 g/dL     Protein Total 6.9 6.4 - 8.9 g/dL    Alkaline Phosphatase 84 34 - 104 U/L    ALT 30 7 - 52 U/L    AST 47 (H) 13 - 39 U/L   Thyrotropin GH   Result Value Ref Range    Thyrotropin 1.85 0.34 - 5.60 IU/mL   Rapid strep screen   Result Value Ref Range    Specimen Description Throat     Rapid Strep A Screen       Negative presumptive for Group A Beta Streptococcus       Medications   diphenhydrAMINE (BENADRYL) capsule 50 mg (50 mg Oral Given 3/23/18 1373)   ibuprofen (ADVIL/MOTRIN) tablet 600 mg (600 mg Oral Given 3/23/18 1615)       Assessments & Plan (with Medical Decision Making)     I have reviewed the nursing notes.    I have reviewed the findings, diagnosis, plan and need for follow up with the patient.  Patient does have a large painful aphthous ulcer on the right side of her tongue. Throat is somewhat erythematous but strep is negative. Labs are all reassuring. She said the ibuprofen she was given here did help. She will continue to use ibuprofen and can use the viscous lidocaine that she was given had a previous clinic visit. She does have somewhat brisk reflexes with a strong one beat clonus in her ankles. Sugar vitals signs are stable with exception of a mild tachycardia. It is possible that she may be having some mild serotonin type symptoms, but I do not believe this is a full-blown serotonin syndrome. I think it reasonable however to try stopping her Paxil. She will continue her amlodipine and follow up in clinic in 1 week's time. Return if worse.  New Prescriptions    No medications on file       Final diagnoses:   Aphthous ulceration   Adverse effect of selective serotonin reuptake inhibitor (SSRI), initial encounter       3/23/2018   [facility]     Haseeb Snider MD  03/23/18 5535

## 2018-03-23 NOTE — NURSING NOTE
Patient presented to [facility] stating she was having an allergic reaction to a medication and felt like her throat was starting to swell up. Per Pau, I transported her by wheelchair to the ER and gave report to the nurse there.

## 2018-03-27 ENCOUNTER — OFFICE VISIT (OUTPATIENT)
Dept: FAMILY MEDICINE | Facility: OTHER | Age: 48
End: 2018-03-27
Attending: NURSE PRACTITIONER
Payer: COMMERCIAL

## 2018-03-27 VITALS
DIASTOLIC BLOOD PRESSURE: 76 MMHG | WEIGHT: 138.38 LBS | SYSTOLIC BLOOD PRESSURE: 124 MMHG | HEART RATE: 116 BPM | TEMPERATURE: 97.8 F | BODY MASS INDEX: 24.21 KG/M2

## 2018-03-27 DIAGNOSIS — K12.0 APHTHOUS ULCER OF MOUTH: Primary | ICD-10-CM

## 2018-03-27 PROCEDURE — 99213 OFFICE O/P EST LOW 20 MIN: CPT | Performed by: NURSE PRACTITIONER

## 2018-03-27 RX ORDER — TRIAMCINOLONE ACETONIDE 0.1 %
PASTE (GRAM) DENTAL 2 TIMES DAILY
Qty: 5 G | Refills: 1 | Status: ON HOLD | OUTPATIENT
Start: 2018-03-27 | End: 2019-02-08

## 2018-03-27 ASSESSMENT — PAIN SCALES - GENERAL: PAINLEVEL: WORST PAIN (10)

## 2018-03-27 NOTE — PROGRESS NOTES
HPI:    Crystal Ferrara is a 47 year old female who presents to clinic today for continued concerns with mouth sores. She has had ongoing mouth sores. Using viscous lidocaine for this with minimal relief of pain.     Past Medical History:   Diagnosis Date     Excessive and frequent menstruation with regular cycle     2013     Personal history of other medical treatment (CODE)     .     Personal history of other medical treatment (CODE)     No Comments Provided       Social History     Social History     Marital status:      Spouse name: N/A     Number of children: N/A     Years of education: N/A     Occupational History     Not on file.     Social History Main Topics     Smoking status: Current Some Day Smoker     Packs/day: 0.25     Years: 8.00     Types: Cigarettes     Last attempt to quit: 2016     Smokeless tobacco: Never Used     Alcohol use Yes      Comment: Alcoholic Drinks/day: once weekly     Drug use: Not on file      Comment: Drug use: No     Sexual activity: Yes     Partners: Male     Birth control/ protection: Pill     Other Topics Concern     Not on file     Social History Narrative    ** Merged History Encounter **         ** [facility] 13 Enc [facility]         ** Data from: 13 Enc Dept: [facility]    p 6/10/2013.       Current Outpatient Prescriptions   Medication Sig Dispense Refill     triamcinolone (KENALOG) 0.1 % paste Take by mouth 2 times daily 5 g 1     amLODIPine (NORVASC) 5 MG tablet Take 1 tablet (5 mg) by mouth daily 30 tablet 0       Allergies   Allergen Reactions     Amoxicillin Hives       ROS:  Pertinent positives and negatives are noted in HPI.    EXAM:  General appearance: well appearing female, in no acute distress  Orophayrnx: moist mucous membranes, large ulcer under right side of tongue and small ulcer to left cheek  Neck: supple without adenopathy  Respiratory: clear to auscultation bilaterally  Cardiac: RRR with no  murmurs  Psychological: normal affect, alert and pleasant. Speech less pressured that in past.     ASSESSMENT AND PLAN:    1. Aphthous ulcer of mouth        She has ongoing ulcers in mouth with no improvement. Will start her on triamcinolone orabase and have her f/u with ENT. She will f/u with me regarding blood pressure and anxiety once these issues resolve. All questions were answered and she is in agreement with plan.       Pau Rashid..................3/27/2018 1:53 PM

## 2018-03-27 NOTE — PATIENT INSTRUCTIONS
Canker Sore    A canker sore (also called an aphthous ulcer) is a painful sore on the lining of the mouth. It is most painful during the first few days, and it lasts about 7 to 14 days before going away.  Causes  Canker sores are not cold sores or fever blisters. They are not contagious, so they are not spread by contact. The exact cause of canker sores is not clear, but there are a number of things that can trigger them in different people.    Mild injury, such as biting the inside of the mouth, lip, or cheek, or dental procedures    Stress    Poor diet, or lack of certain nutrients, including B vitamins and iron    Foods that can irritate the mouth, including tomatoes, citrus fruits, and some nuts (foods that are acidic or contain bitter substances called tannins)    Irritating chemicals, such as those in some toothpastes and mouthwashes    Certain chronic illnesses  Symptoms  Canker sores are found on the lining of the mouth. They can be inside the cheeks or lips, on the roof of the mouth, at the base of the gums, on the tongue, or in the back of the throat. Canker sores typically have these characteristics:    Small, flat (not raised) sores    Can be white or yellowish bumps that are red around the edges or have a red halo    Usually small in size, roundish, and in groups    Accompanied by pain or burning  Canker sores do not leave a scar. But they usually come back.  Home care  The goals of canker sore treatment are to decrease the pain, speed healing, and prevent recurrence. No single treatment works for everyone. Try a number of techniques to see what works best.  General care    You may find that soft, easy-to-chew foods cause less pain. Use a straw to direct liquids away from the sore.    Use a soft-bristle toothbrush, and brush your teeth gently.    Avoid acidic, salty, or spicy foods.    Avoid injuring the inside of your mouth, or scraping your existing canker sores, by avoiding crusty and crunchy foods  like french bread and chips.  Medicines  You can try over-the-counter medicines that cover the sores and numb them. This protects the sores while they heal and helps reduce pain.  Homemade rinses and solutions  You can use these solutions as mouth rinses. Spit them out after using them. You can also dab them on the sores. You can repeat these treatments as often as needed.    Rinse your mouth with saltwater.    Mix equal amounts of hydrogen peroxide and water. You can use it as a mouthwash or dab it on spots with a cotton swab. You can also add sodium bicarbonate to this to make a paste, and then dab it on spots.  Follow-up care  Follow up with your healthcare provider, or as advised.    If a culture was done, you will be notified if the treatment needs to be changed. You can call as directed for the results.  Call 911  Contact emergency services if any of these occur:    Trouble breathing    Inability to swallow    Extreme drowsiness or trouble awakening    Fainting or loss of consciousness    Rapid heart rate    Seizure    Stiff neck  When to seek medical advice  Call your healthcare provider right away if any of these occur:    You have a fever of 100.4 F (38 C) or higher.    You are pregnant.    You just had surgery or another medical procedure, or you were just discharged from the hospital.    You are unable to eat or swallow due to pain.  Date Last Reviewed: 7/30/2015 2000-2017 [employer]. [address] [place] [address], [place] [address]. All rights reserved. This information is not intended as a substitute for professional medical care. Always follow your healthcare professional's instructions.

## 2018-03-27 NOTE — MR AVS SNAPSHOT
After Visit Summary   3/27/2018    Crystal Ferrara    MRN: [id number]           Patient Information     Date Of Birth          1970        Visit Information        Provider Department      3/27/2018 1:45 PM Pau Rashid APRN CNP [facility]        Today's Diagnoses     Aphthous ulcer of mouth    -  1      Care Instructions      Canker Sore    A canker sore (also called an aphthous ulcer) is a painful sore on the lining of the mouth. It is most painful during the first few days, and it lasts about 7 to 14 days before going away.  Causes  Canker sores are not cold sores or fever blisters. They are not contagious, so they are not spread by contact. The exact cause of canker sores is not clear, but there are a number of things that can trigger them in different people.    Mild injury, such as biting the inside of the mouth, lip, or cheek, or dental procedures    Stress    Poor diet, or lack of certain nutrients, including B vitamins and iron    Foods that can irritate the mouth, including tomatoes, citrus fruits, and some nuts (foods that are acidic or contain bitter substances called tannins)    Irritating chemicals, such as those in some toothpastes and mouthwashes    Certain chronic illnesses  Symptoms  Canker sores are found on the lining of the mouth. They can be inside the cheeks or lips, on the roof of the mouth, at the base of the gums, on the tongue, or in the back of the throat. Canker sores typically have these characteristics:    Small, flat (not raised) sores    Can be white or yellowish bumps that are red around the edges or have a red halo    Usually small in size, roundish, and in groups    Accompanied by pain or burning  Canker sores do not leave a scar. But they usually come back.  Home care  The goals of canker sore treatment are to decrease the pain, speed healing, and prevent recurrence. No single treatment works for everyone. Try a number of  techniques to see what works best.  General care    You may find that soft, easy-to-chew foods cause less pain. Use a straw to direct liquids away from the sore.    Use a soft-bristle toothbrush, and brush your teeth gently.    Avoid acidic, salty, or spicy foods.    Avoid injuring the inside of your mouth, or scraping your existing canker sores, by avoiding crusty and crunchy foods like french bread and chips.  Medicines  You can try over-the-counter medicines that cover the sores and numb them. This protects the sores while they heal and helps reduce pain.  Homemade rinses and solutions  You can use these solutions as mouth rinses. Spit them out after using them. You can also dab them on the sores. You can repeat these treatments as often as needed.    Rinse your mouth with saltwater.    Mix equal amounts of hydrogen peroxide and water. You can use it as a mouthwash or dab it on spots with a cotton swab. You can also add sodium bicarbonate to this to make a paste, and then dab it on spots.  Follow-up care  Follow up with your healthcare provider, or as advised.    If a culture was done, you will be notified if the treatment needs to be changed. You can call as directed for the results.  Call 911  Contact emergency services if any of these occur:    Trouble breathing    Inability to swallow    Extreme drowsiness or trouble awakening    Fainting or loss of consciousness    Rapid heart rate    Seizure    Stiff neck  When to seek medical advice  Call your healthcare provider right away if any of these occur:    You have a fever of 100.4 F (38 C) or higher.    You are pregnant.    You just had surgery or another medical procedure, or you were just discharged from the hospital.    You are unable to eat or swallow due to pain.  Date Last Reviewed: 7/30/2015 2000-2017 [employer]. [address] [place] [address], [place] [address]. All rights reserved. This information is not intended as a substitute for  "professional medical care. Always follow your healthcare professional's instructions.                Follow-ups after your visit        Additional Services     OTOLARYNGOLOGY REFERRAL       Your provider has referred you to: [facility]    Please be aware that coverage of these services is subject to the terms and limitations of your health insurance plan.  Call member services at your health plan with any benefit or coverage questions.      Please bring the following with you to your appointment:    (1) Any X-Rays, CTs or MRIs which have been performed.  Contact the facility where they were done to arrange for  prior to your scheduled appointment.   (2) List of current medications  (3) This referral request   (4) Any documents/labs given to you for this referral                  Who to contact     If you have questions or need follow up information about today's clinic visit or your schedule please contact [facility] directly at [phone].  Normal or non-critical lab and imaging results will be communicated to you by MyChart, letter or phone within 4 business days after the clinic has received the results. If you do not hear from us within 7 days, please contact the clinic through [employer] or phone. If you have a critical or abnormal lab result, we will notify you by phone as soon as possible.  Submit refill requests through [employer] or call your pharmacy and they will forward the refill request to us. Please allow 3 business days for your refill to be completed.          Additional Information About Your Visit        MyChart Information     [employer] lets you send messages to your doctor, view your test results, renew your prescriptions, schedule appointments and more. To sign up, go to [email or web address] . Click on \"Log in\" on the left side of the screen, which will take you to the Welcome page. Then click on \"Sign up Now\" on the right side of the page.     You will be asked to " enter the access code listed below, as well as some personal information. Please follow the directions to create your username and password.     Your access code is: [email or web address]  Expires: 2018 11:24 AM     Your access code will  in 90 days. If you need help or a new code, please call your [facility] clinic or [phone].        Care EveryWhere ID     This is your Care EveryWhere ID. This could be used by other organizations to access your [facility] medical records  [id number]        Your Vitals Were     Pulse Temperature BMI (Body Mass Index)             116 97.8  F (36.6  C) (Temporal) 24.21 kg/m2          Blood Pressure from Last 3 Encounters:   18 124/76   18 125/85   18 102/60    Weight from Last 3 Encounters:   18 138 lb 6 oz (62.8 kg)   18 141 lb 6 oz (64.1 kg)   18 144 lb (65.3 kg)              We Performed the Following     OTOLARYNGOLOGY REFERRAL          Today's Medication Changes          These changes are accurate as of 3/27/18  2:03 PM.  If you have any questions, ask your nurse or doctor.               Start taking these medicines.        Dose/Directions    triamcinolone 0.1 % paste   Commonly known as:  KENALOG   Used for:  Aphthous ulcer of mouth   Started by:  Pau Rashid APRN CNP        Take by mouth 2 times daily   Quantity:  5 g   Refills:  1            Where to get your medicines      These medications were sent to [facility] [address] [place], [place] - [address]  [address], [facility] [address]     Phone:  [phone]     triamcinolone 0.1 % paste                Primary Care Provider Fax #    Physician No Ref-Primary [phone]       No address on file        Equal Access to Services     BELÉN ORTEZ AH: Janelle Chavez, waaxda luqadaha, qaybta kaalmada luz, adalgisa ashley. So [facility] [phone].    ATENCIÓN: Si john kirby  disposición servicios gratuitos de asistencia lingüística. Blayne soto [phone].    We comply with applicable federal civil rights laws and [place] laws. We do not discriminate on the basis of race, color, national origin, age, disability, sex, sexual orientation, or gender identity.            Thank you!     Thank you for choosing [facility]  for your care. Our goal is always to provide you with excellent care. Hearing back from our patients is one way we can continue to improve our services. Please take a few minutes to complete the written survey that you may receive in the mail after your visit with us. Thank you!             Your Updated Medication List - Protect others around you: Learn how to safely use, store and throw away your medicines at www.disposemymeds.org.          This list is accurate as of 3/27/18  2:03 PM.  Always use your most recent med list.                   Brand Name Dispense Instructions for use Diagnosis    amLODIPine 5 MG tablet    NORVASC    30 tablet    Take 1 tablet (5 mg) by mouth daily    Hypertension, unspecified type       triamcinolone 0.1 % paste    KENALOG    5 g    Take by mouth 2 times daily    Aphthous ulcer of mouth

## 2018-03-27 NOTE — NURSING NOTE
Patient presents to clinic today for continued problems with mouth sores.    Patient declines PHQ and DANIA    Kathy Diego LPN...................3/27/2018  1:51 PM

## 2018-04-01 ENCOUNTER — HEALTH MAINTENANCE LETTER (OUTPATIENT)
Age: 48
End: 2018-04-01

## 2018-04-06 ENCOUNTER — TELEPHONE (OUTPATIENT)
Dept: FAMILY MEDICINE | Facility: OTHER | Age: 48
End: 2018-04-06

## 2018-04-06 NOTE — TELEPHONE ENCOUNTER
Spoke with schedulers and they had called her stating information had been sent for ENT referral and she would receive a call. Relayed this to patient and she will wait to schedule or call if she doesn't hear from them by early next week.    Kathy Diego LPN...................4/6/2018  9:28 AM

## 2018-04-10 ENCOUNTER — TELEPHONE (OUTPATIENT)
Dept: FAMILY MEDICINE | Facility: OTHER | Age: 48
End: 2018-04-10

## 2018-04-13 ENCOUNTER — OFFICE VISIT (OUTPATIENT)
Dept: FAMILY MEDICINE | Facility: OTHER | Age: 48
End: 2018-04-13
Attending: NURSE PRACTITIONER
Payer: COMMERCIAL

## 2018-04-13 VITALS
BODY MASS INDEX: 24.35 KG/M2 | TEMPERATURE: 97.5 F | SYSTOLIC BLOOD PRESSURE: 156 MMHG | HEART RATE: 100 BPM | WEIGHT: 139.13 LBS | DIASTOLIC BLOOD PRESSURE: 102 MMHG

## 2018-04-13 DIAGNOSIS — I10 ESSENTIAL HYPERTENSION: ICD-10-CM

## 2018-04-13 DIAGNOSIS — K13.79 MOUTH SORES: Primary | ICD-10-CM

## 2018-04-13 PROCEDURE — 99214 OFFICE O/P EST MOD 30 MIN: CPT | Performed by: NURSE PRACTITIONER

## 2018-04-13 ASSESSMENT — PAIN SCALES - GENERAL: PAINLEVEL: NO PAIN (0)

## 2018-04-13 NOTE — PROGRESS NOTES
HPI:    Crystal Ferrara is a 47 year old female who presents to clinic today for concerns of scalp feeling numb. She has had multiple complaints over the past couple months. Often will blame her medications so her BP medications have been changed a couple times. She was also stopped from her anxiety medication due to concerns of s/e. She has again stopped her Norvasc. Blood pressures are elevated again. She has had ongoing mouth sores that have not responded to time or treatments including magic mouth wash and triamcinolone. Referred to ENT with appointment in a couple weeks with Dr Palacios at [facility].     Past Medical History:   Diagnosis Date     Excessive and frequent menstruation with regular cycle     2013     Personal history of other medical treatment (CODE)     .     Personal history of other medical treatment (CODE)     No Comments Provided       Past Surgical History:   Procedure Laterality Date     APPENDECTOMY OPEN      No Comments Provided     TONSILLECTOMY      No Comments Provided       Family History   Problem Relation Age of Onset     Family History Negative Mother      Good Health     Hypertension Mother      Hypertension     Family History Negative Father      Good Health     Family History Negative Sister      Good Health     Family History Negative Son      Good Health     Family History Negative Daughter      Good Health     Breast Cancer Maternal Aunt      Cancer-breast     Breast Cancer Paternal Aunt      Cancer-breast       Social History     Social History     Marital status:      Spouse name: N/A     Number of children: N/A     Years of education: N/A     Occupational History     Not on file.     Social History Main Topics     Smoking status: Current Some Day Smoker     Packs/day: 0.25     Years: 8.00     Types: Cigarettes     Last attempt to quit: 2016     Smokeless tobacco: Never Used     Alcohol use Yes      Comment: Alcoholic Drinks/day: once weekly     Drug use:  Not on file      Comment: Drug use: No     Sexual activity: Yes     Partners: Male     Birth control/ protection: Pill     Other Topics Concern     Not on file     Social History Narrative    ** Merged History Encounter **         ** Data from: 4/1/13 Enc Dept: [facility]         ** Data from: 6/24/13 [facility] Dept: [facility]    p 6/10/2013.       Current Outpatient Prescriptions   Medication Sig Dispense Refill     triamcinolone (KENALOG) 0.1 % paste Take by mouth 2 times daily 5 g 1     amLODIPine (NORVASC) 5 MG tablet Take 1 tablet (5 mg) by mouth daily 30 tablet 0       Allergies   Allergen Reactions     Amoxicillin Hives       ROS:  Pertinent positives and negatives are noted in HPI.    EXAM:  General appearance: well appearing female, in no acute distress  Head: normocephalic, atraumatic  Ears: TM's with cone of light, no erythema, canals clear bilaterally  Eyes: conjunctivae normal  Orophayrnx: moist mucous membranes, tonsils without erythema, exudates or petechiae, no post nasal drip seen. Left buccal mucosa with single white lesion.   Neck: supple without adenopathy  Respiratory: clear to auscultation bilaterally  Cardiac: RRR with no murmurs  Dermatological: no rashes or lesions  Neuro: CN II-XII grossly intact, DTR's intact  Psychological: normal affect, alert and pleasant    PHQ Depression Screen  PHQ-9 SCORE 2/9/2018 2/23/2018 4/13/2018   Total Score 9 8 0       ASSESSMENT AND PLAN:    1. Mouth sores    2. Essential hypertension      1. Mouth sores are improving, 1 remaining. Recommend continuing current tx and f/u with ENT as planned.   2. Jordi conversation with her regarding blood pressure medications and need to keep her BP under control. She is not ready to manage her anxiety at this time given all the perceived s/e of medications. She was reassured about her concerns and recommend f/u with me once she has had 1 month of blood pressure medication intake. All questions were answered  and she is in agreement with plan.          Pau Rashid..................4/13/2018 11:08 AM

## 2018-04-13 NOTE — MR AVS SNAPSHOT
After Visit Summary   4/13/2018    Crystal Ferrara    MRN: [id number]           Patient Information     Date Of Birth          1970        Visit Information        Provider Department      4/13/2018 11:00 AM Pau Rashid APRN CNP [facility]        Today's Diagnoses     Mouth sores    -  1    Essential hypertension          Care Instructions    Take your norvasc daily  Limit salt intake  See ENT as planned  Follow up in 1 month after consistently taking the medications          Follow-ups after your visit        Additional Services     OTOLARYNGOLOGY REFERRAL       Your provider has referred you to: ENT    Please be aware that coverage of these services is subject to the terms and limitations of your health insurance plan.  Call member services at your health plan with any benefit or coverage questions.      Please bring the following with you to your appointment:    (1) Any X-Rays, CTs or MRIs which have been performed.  Contact the facility where they were done to arrange for  prior to your scheduled appointment.   (2) List of current medications  (3) This referral request   (4) Any documents/labs given to you for this referral                  Your next 10 appointments already scheduled     May 14, 2018  2:00 PM CDT   (Arrive by 1:45 PM)   New Visit with Katrina Kuhn MD   [facility] ([facility] )    [address] [place] [address] Ave  [facility] [address]   [phone]              Who to contact     If you have questions or need follow up information about today's clinic visit or your schedule please contact [facility] AND hospitals directly at [phone].  Normal or non-critical lab and imaging results will be communicated to you by MyChart, letter or phone within 4 business days after the clinic has received the results. If you do not hear from us within 7 days, please contact the clinic through MyChart or phone. If you  "have a critical or abnormal lab result, we will notify you by phone as soon as possible.  Submit refill requests through [employer] or call your pharmacy and they will forward the refill request to us. Please allow 3 business days for your refill to be completed.          Additional Information About Your Visit        HauteLookhart Information     [employer] lets you send messages to your doctor, view your test results, renew your prescriptions, schedule appointments and more. To sign up, go to [email or web address] . Click on \"Log in\" on the left side of the screen, which will take you to the Welcome page. Then click on \"Sign up Now\" on the right side of the page.     You will be asked to enter the access code listed below, as well as some personal information. Please follow the directions to create your username and password.     Your access code is: [email or web address]  Expires: 2018 11:24 AM     Your access code will  in 90 days. If you need help or a new code, please call your [place] clinic or [phone].        Care EveryWhere ID     This is your Care EveryWhere ID. This could be used by other organizations to access your [place] medical records  [id number]        Your Vitals Were     Pulse Temperature BMI (Body Mass Index)             100 97.5  F (36.4  C) (Tympanic) 24.35 kg/m2          Blood Pressure from Last 3 Encounters:   18 (!) 156/102   18 124/76   18 125/85    Weight from Last 3 Encounters:   18 139 lb 2 oz (63.1 kg)   18 138 lb 6 oz (62.8 kg)   18 141 lb 6 oz (64.1 kg)              We Performed the Following     OTOLARYNGOLOGY REFERRAL        Primary Care Provider Office Phone # Fax #    SALONI Fuller [phone] [facility] [id number]        Equal Access to Services     MAXINE ORTEZ AH: Janelle Chavez, shannan soriano, qaadalgisa suarezaan ah. So [facility] " [phone].    ATENCIÓN: Si allison rouse, tiene a martinez disposición servicios gratuitos de asistencia lingüística. Blayne soto [phone].    We comply with applicable federal civil rights laws and [place] laws. We do not discriminate on the basis of race, color, national origin, age, disability, sex, sexual orientation, or gender identity.            Thank you!     Thank you for choosing [facility] AND [facility]  for your care. Our goal is always to provide you with excellent care. Hearing back from our patients is one way we can continue to improve our services. Please take a few minutes to complete the written survey that you may receive in the mail after your visit with us. Thank you!             Your Updated Medication List - Protect others around you: Learn how to safely use, store and throw away your medicines at www.disposemymeds.org.          This list is accurate as of 4/13/18 11:26 AM.  Always use your most recent med list.                   Brand Name Dispense Instructions for use Diagnosis    amLODIPine 5 MG tablet    NORVASC    30 tablet    Take 1 tablet (5 mg) by mouth daily    Hypertension, unspecified type       triamcinolone 0.1 % paste    KENALOG    5 g    Take by mouth 2 times daily    Aphthous ulcer of mouth

## 2018-04-13 NOTE — PATIENT INSTRUCTIONS
Take your norvasc daily  Limit salt intake  See ENT as planned  Follow up in 1 month after consistently taking the medications

## 2018-04-13 NOTE — NURSING NOTE
Patient presents to clinic today for concerns of scalp feeling numb. Patient is NOT taking her blood pressure medication.    Kathy Diego LPN...................4/13/2018  11:09 AM

## 2018-04-14 ASSESSMENT — PATIENT HEALTH QUESTIONNAIRE - PHQ9: SUM OF ALL RESPONSES TO PHQ QUESTIONS 1-9: 0

## 2018-04-16 DIAGNOSIS — I10 HYPERTENSION, UNSPECIFIED TYPE: ICD-10-CM

## 2018-04-20 RX ORDER — AMLODIPINE BESYLATE 5 MG/1
TABLET ORAL
Qty: 30 TABLET | Refills: 10 | Status: SHIPPED | OUTPATIENT
Start: 2018-04-20 | End: 2018-12-10

## 2018-04-20 NOTE — TELEPHONE ENCOUNTER
[facility] pharmacy and pt request a Rx refill for amlodipine (Norvasc).  Calcium Channel Blocker protocol failed due to no blood pressure under 140/90 in past 12 months.  Pt's last exam appt with PCP Pau Rashid NP was on 4-13-18.  Will route to PCP for review and consideration of refills.  Unable to complete prescription refill per RN Medication Refill Policy.................... Rachel Ferreira ....................  4/20/2018   11:41 AM

## 2018-05-07 ENCOUNTER — NURSE TRIAGE (OUTPATIENT)
Dept: FAMILY MEDICINE | Facility: OTHER | Age: 48
End: 2018-05-07

## 2018-05-07 NOTE — TELEPHONE ENCOUNTER
"In clinical absence of patient's primary, Pau Rashid, patient is requesting that this message be sent to the Doc of the Day for consideration please. As triage protocol is unclear due to the fact that Patient missed dose of amlodipine last night, writer will meanwhile route message to DOD in absence of PCP.    Patient called today stating her blood pressure is high. Patient triaged.    Patient checked her blood pressure 30 minutes ago with home monitor and it was 212/124. Patient is experiencing nausea, headache and shakiness. Patient denies chest pain, weakness, numbness, difficulty breathing or changes in vision. Patient has history of HTN and takes amlodipine 5 mg daily, \"because this is the only blood pressure medication I have not been allergic to.\" Patient states she missed her dose last night. Patient stated she was sitting, trying to relax, and planned to change batteries in monitor, recheck BP and call back.  Patient added that she has appointment with Dr. Palacios tomorrow.    Per triage protocol, it was recommended that Patient be evaluated in immediate office visit. Patient also instructed to report to ED if she experiences weakness/numbness, chest pain, difficulty breathing or worsening symptoms. Refill nurse recommended transferring to scheduling line and Patient, hurriedly let writer go, since her  \"got a giant sliver\" and needed her help. Triage nurse will wait for Patient to return call with updated BP and follow up as needed.     Reason for Disposition    [1] BP  >= 200/120  AND [2] having NO cardiac or neurologic symptoms    Additional Information    Negative: Difficult to awaken or acting confused  (e.g., disoriented, slurred speech)    Negative: Severe difficulty breathing (e.g., struggling for each breath, speaks in single words)    Negative: [1] Weakness of the face, arm or leg on one side of the body AND [2] new onset    Negative: [1] Numbness (i.e., loss of sensation) of the " "face, arm or leg on one side of the body AND [2] new onset    Negative: [1] Chest pain lasts > 5 minutes AND [2] history of heart disease  (i.e., heart attack, bypass surgery, angina, angioplasty, CHF)    Negative: [1] Chest pain AND [2] took nitrogylcerin AND [3] pain was not relieved    Negative: Sounds like a life-threatening emergency to the triager    Negative: Symptom is main concern  (e.g., headache, chest pain)    Negative: Low blood pressure is main concern    Negative: [1] BP  >= 160 / 100 AND [2] cardiac or neurologic symptoms    (e.g., chest pain, difficulty breathing, unsteady gait, blurred vision)    Negative: [1] Pregnant AND [2] new hand or face swelling    Negative: [1] Pregnant > 20 weeks AND [2] BP  >= 140/90    [1] BP  >= 180/110 AND [2] missed most recent dose of blood pressure medication    Answer Assessment - Initial Assessment Questions  Patient was in to see Pau Rashid on 4/13/18 for elevated blood pressure. The plan was for Patient to take 5 mg norvasc daily, limit salt intake, see ENT and f/u in 1 month after consisently taking the medications.    Patient is scheduled to see Dr. Palacios tomorrow.    1. BLOOD PRESSURE: \"What is the blood pressure?\" \"Did you take at least two measurements 5 minutes apart?\"  212/124  Patient states she is using her mother's [employer] home BP monitor and is going to try changing the batteries just to be sure.     2. ONSET: \"When did you take your blood pressure?\"  30 minutes ago    3. HOW: \"How did you obtain the blood pressure?\"    Automatic home BP monitor    4. HISTORY: \"Do you have a history of high blood pressure?\"  Yes    5. MEDICATIONS: \"Are you taking any medications for blood pressure?\" \"Have you missed any doses recently?\"  Amlodipine- 5 mg daily  Missed 1 dose last night    6. OTHER SYMPTOMS: \"Do you have any symptoms?\" (e.g., headache, chest pain, blurred vision, difficulty breathing, weakness)  Nauseated with headache, shaky  Denies chest pain, " "changes in vision, difficulty breathing or weakness    Patient is trying to sit and relax. Patient had to go because her \" got a giant sliver.\" Patient stated she would recheck her BP and call right back.    Protocols used: HIGH BLOOD PRESSURE-ADULT-    Rachel Choudhary RN .............. 5/7/2018  9:48 AM    "

## 2018-05-07 NOTE — TELEPHONE ENCOUNTER
Left message on machine to call back. Noted that Patient has not yet called back with 2nd BP reading or scheduled appointment in clinic for today.    Rachel Choudhary RN .............. 5/7/2018  11:47 AM

## 2018-05-07 NOTE — TELEPHONE ENCOUNTER
"Called and spoke to Patient after verifying last name and date of birth. Patient states she took her BP again and it has decreased to 157/113. Patient notified of recommendations of Dr. Gregory. Patient states she did a dose of amlodipine this morning and her BP went down. Patient is at home waiting for her  to get home and he will drive her in. Patient plans to call on her way to see if there are any immediate openings in the clinic, otherwise will go to the ED.     Patient adds that amlodipine makes her \"puff up like a blow fish\" and that she \"threw it in the trash and dug it back out this morning.\"    Rachel Choudhary RN .............. 5/7/2018  12:59 PM        "

## 2018-05-07 NOTE — TELEPHONE ENCOUNTER
She needs to be seen today, either in clinic soon, or in the ED.  GLENN COELLO MD on 5/7/2018 at 11:42 AM

## 2018-05-08 ENCOUNTER — OFFICE VISIT (OUTPATIENT)
Dept: OTOLARYNGOLOGY | Facility: OTHER | Age: 48
End: 2018-05-08
Attending: OTOLARYNGOLOGY
Payer: COMMERCIAL

## 2018-05-08 DIAGNOSIS — Z87.19 HX OF ORAL APHTHOUS ULCERS: Primary | ICD-10-CM

## 2018-05-08 PROCEDURE — G0463 HOSPITAL OUTPT CLINIC VISIT: HCPCS

## 2018-05-08 NOTE — NURSING NOTE
Patient is here today for a consult for mouth ulcers.Elsa Conley LPN......................5/8/2018 12:19 PM

## 2018-05-08 NOTE — MR AVS SNAPSHOT
"              After Visit Summary   2018    Crystal Ferrara    MRN: [id number]           Patient Information     Date Of Birth          1970        Visit Information        Provider Department      2018 11:50 AM Celestine Palacios MD [facility]        Today's Diagnoses     Hx of oral aphthous ulcers    -  1       Follow-ups after your visit        Who to contact     If you have questions or need follow up information about today's clinic visit or your schedule please contact [facility] directly at [phone].  Normal or non-critical lab and imaging results will be communicated to you by LikeBrighthart, letter or phone within 4 business days after the clinic has received the results. If you do not hear from us within 7 days, please contact the clinic through [employer] or phone. If you have a critical or abnormal lab result, we will notify you by phone as soon as possible.  Submit refill requests through [employer] or call your pharmacy and they will forward the refill request to us. Please allow 3 business days for your refill to be completed.          Additional Information About Your Visit        MyChart Information     [employer] lets you send messages to your doctor, view your test results, renew your prescriptions, schedule appointments and more. To sign up, go to [email or web address] . Click on \"Log in\" on the left side of the screen, which will take you to the Welcome page. Then click on \"Sign up Now\" on the right side of the page.     You will be asked to enter the access code listed below, as well as some personal information. Please follow the directions to create your username and password.     Your access code is: [email or web address]  Expires: 2018 11:24 AM     Your access code will  in 90 days. If you need help or a new code, please call your [place] clinic or [phone].        Care EveryWhere ID     This is your Care EveryWhere ID. This could be " used by other organizations to access your [facility] medical records  [id number]         Blood Pressure from Last 3 Encounters:   04/13/18 (!) 156/102   03/27/18 124/76   03/23/18 125/85    Weight from Last 3 Encounters:   04/13/18 63.1 kg (139 lb 2 oz)   03/27/18 62.8 kg (138 lb 6 oz)   03/05/18 64.1 kg (141 lb 6 oz)              Today, you had the following     No orders found for display       Primary Care Provider Office Phone # Fax #    Pau Rashid, APRN [phone]       [address] [facility] [address]        Equal Access to Services     MAXINE ORTEZ : Hadii hilton Chavez, watiesha soriano, qaybta kaalmada luz, adalgisa garcia . So [facility] [phone].    ATENCIÓN: Si habla español, tiene a martinez disposición servicios gratuitos de asistencia lingüística. Llame al [phone].    We comply with applicable federal civil rights laws and [place] laws. We do not discriminate on the basis of race, color, national origin, age, disability, sex, sexual orientation, or gender identity.            Thank you!     Thank you for choosing [facility] AND [facility]  for your care. Our goal is always to provide you with excellent care. Hearing back from our patients is one way we can continue to improve our services. Please take a few minutes to complete the written survey that you may receive in the mail after your visit with us. Thank you!             Your Updated Medication List - Protect others around you: Learn how to safely use, store and throw away your medicines at www.disposemymeds.org.          This list is accurate as of 5/8/18 11:59 PM.  Always use your most recent med list.                   Brand Name Dispense Instructions for use Diagnosis    amLODIPine 5 MG tablet    NORVASC    30 tablet    TAKE 1 TABLET BY MOUTH EVERY DAY    Hypertension, unspecified type       triamcinolone 0.1 % paste    KENALOG    5 g    Take by mouth 2 times  daily    Aphthous ulcer of mouth

## 2018-05-23 NOTE — PROGRESS NOTES
AYAD GRAF    48 Y old Female, : 1970    Account Number: [id number]    [address], [place], [place]    Home: [phone]     Guarantor: AYAD GRAF Insurance: [facility] Payer ID:       Appointment Facility: [facility]      2018 Progress Notes: Celestine Palacios MD       Current Medications Reason for Appointment     1. MOUTH ULCER CONSULT     2. History of oral ulcer     History of Present Illness     HPI:   The patient is a 48-year-old female who developed oral ulcers following initiation of a blood pressure medication. The ulcerations have resolved completely. There is no residual pain or irritation.     Examination     General Examination:  Cavity oropharynx-free of lesions or inflammation   Neck-no masses or adenopathy   Head neck integument-Clear   General-the patient appears well and in no distress   Neuro-there are no focal cranial nerve deficits   Nasal-no obstruction or purulence.       Assessments     1. History of oral aphthous ulcers - Z87.19 (Primary)     Treatment     1. Others   Notes: Patient was reassured that I see no evidence of malignancy or infection at this time. Certainly drug reactions can result in oral lesions. Viral infections can cause similar problems as well as autoimmune illnesses. I have no testing to determine the cause of her problem. She is welcome to follow up if she has difficulties in the future.  Procedures  [ ].                Follow Up     prn         Taking      Amlodipine 5 MG Tablet 1 tablet Orally Once a day      Medication List reviewed and reconciled with the patient           Past Medical History     Dizziness.       Headaches.       HTN.       Surgical History Electronically signed by CELESTINE PALACIOS MD on 2018 at 12:22 PM CDT    Tonsil      Appendix      Uterine ablation      Social History Sign off status: Completed    Tobacco Use:   Smoking   History: former smoker  Second Hand Smoking Exposure   : No      Allergies     Amoxicillin     Hydrochlorothiazide     Lisinopril     BEE VENOM     Review of Systems     [facility]  [address]  [place] [address]  Tel: [phone]  Fax:       [ ].           Patient: LESIA AYAD D : 1970 Progress Note: Celestine Palacios MD 2018        Note generated by mytrax EMR/PM Software ([email or web address]    true

## 2018-07-24 NOTE — PROGRESS NOTES
Patient Information     Patient Name  Crystal Ferrara MRN  [id number] Sex  Female   1970      Letter by Rachel Ugalde MD at      Author:  Rachel Ugalde MD Service:  (none) Author Type:  (none)    Filed:   Date of Service:   Status:  (Other)       [facility]  [address]  [facility] [address]  [phone]         Crystal Ferrara   [address]  [facility] [address]      2018  Date of Breast Imagin2018 11:00 AM    Dear Ms. Ferrara:  We are pleased to inform you that the result of your recent breast imaging examination is normal/benign (not cancer). A report of your results was sent to your health care provider(s).    Your mammogram shows that your breast tissue is dense.  Dense breast tissue is relatively common and is found in more than 50 percent of women. Dense breast tissue may be associated with a slight increased risk of breast cancer and may make cancer more difficult to detect by mammogram. However, the actual risk of breast cancer for women with dense breast tissue is still low. This information is given to you to raise your own awareness and help you talk with your primary care provider. Together, you can decide which screening options are right for you.     Your images will become part of your medical file here at [facility] and will be available for your continuing care. You are responsible for informing any new health care provider or breast imaging facility of the date and location of this examination.    Mammography remains the gold standard and is the most accurate method for early detection. Mammograms are the only medical imaging test shown to reduce breast cancer deaths. Not all cancers are found through mammography. If you notice any new changes in your breast(s) please inform your healthcare provider.     Thank you for choosing [facility] to participate in your healthcare needs.     [facility] Recommendations for Early Breast Cancer Detection   in Women without Symptoms  When to start having mammograms to screen for breast cancer, and how often to have them, is a personal decision. It should be based on your preferences, your values and your risk for developing breast cancer. [facility] recommends that you and your health care provider together determine when mammograms are right for you.    [facility] recommends the following guidelines for women who have an average risk for breast cancer, based on American Cancer Society guidelines:    Age 40 to 44: Mammograms are optional.     Age 45 to 54: Have a mammogram every year.           Age 55 and older: Have a mammogram every year, or transition to having one every 2 years. Continue to have mammograms as long as your health is good.  If you have a higher than average risk for breast cancer, your health care provider may recommend a different schedule.

## 2018-12-10 ENCOUNTER — OFFICE VISIT (OUTPATIENT)
Dept: FAMILY MEDICINE | Facility: OTHER | Age: 48
End: 2018-12-10
Attending: NURSE PRACTITIONER
Payer: COMMERCIAL

## 2018-12-10 VITALS
SYSTOLIC BLOOD PRESSURE: 162 MMHG | WEIGHT: 132.5 LBS | BODY MASS INDEX: 23.19 KG/M2 | DIASTOLIC BLOOD PRESSURE: 100 MMHG | HEART RATE: 80 BPM

## 2018-12-10 DIAGNOSIS — M25.50 MULTIPLE JOINT PAIN: Primary | ICD-10-CM

## 2018-12-10 DIAGNOSIS — I10 HYPERTENSION, UNSPECIFIED TYPE: ICD-10-CM

## 2018-12-10 DIAGNOSIS — R53.83 FATIGUE, UNSPECIFIED TYPE: ICD-10-CM

## 2018-12-10 DIAGNOSIS — I10 ESSENTIAL HYPERTENSION: ICD-10-CM

## 2018-12-10 LAB
ANION GAP SERPL CALCULATED.3IONS-SCNC: 7 MMOL/L (ref 3–14)
BUN SERPL-MCNC: 8 MG/DL (ref 7–25)
CALCIUM SERPL-MCNC: 9.4 MG/DL (ref 8.6–10.3)
CHLORIDE SERPL-SCNC: 99 MMOL/L (ref 98–107)
CO2 SERPL-SCNC: 30 MMOL/L (ref 21–31)
CREAT SERPL-MCNC: 0.66 MG/DL (ref 0.6–1.2)
CRP SERPL-MCNC: 0.2 MG/L
DEPRECATED CALCIDIOL+CALCIFEROL SERPL-MC: 27.9 NG/ML
ERYTHROCYTE [DISTWIDTH] IN BLOOD BY AUTOMATED COUNT: 11.7 % (ref 10–15)
ERYTHROCYTE [SEDIMENTATION RATE] IN BLOOD BY WESTERGREN METHOD: 17 MM/H (ref 1–15)
GFR SERPL CREATININE-BSD FRML MDRD: >90 ML/MIN/1.7M2
GLUCOSE SERPL-MCNC: 88 MG/DL (ref 70–105)
HCT VFR BLD AUTO: 44.6 % (ref 35–47)
HGB BLD-MCNC: 15.2 G/DL (ref 11.7–15.7)
MCH RBC QN AUTO: 32 PG (ref 26.5–33)
MCHC RBC AUTO-ENTMCNC: 34.1 G/DL (ref 31.5–36.5)
MCV RBC AUTO: 94 FL (ref 78–100)
PLATELET # BLD AUTO: 244 10E9/L (ref 150–450)
POTASSIUM SERPL-SCNC: 3.3 MMOL/L (ref 3.5–5.1)
RBC # BLD AUTO: 4.75 10E12/L (ref 3.8–5.2)
SODIUM SERPL-SCNC: 136 MMOL/L (ref 134–144)
WBC # BLD AUTO: 6.6 10E9/L (ref 4–11)

## 2018-12-10 PROCEDURE — 82306 VITAMIN D 25 HYDROXY: CPT | Performed by: NURSE PRACTITIONER

## 2018-12-10 PROCEDURE — 85027 COMPLETE CBC AUTOMATED: CPT | Performed by: NURSE PRACTITIONER

## 2018-12-10 PROCEDURE — 80048 BASIC METABOLIC PNL TOTAL CA: CPT | Performed by: NURSE PRACTITIONER

## 2018-12-10 PROCEDURE — 99214 OFFICE O/P EST MOD 30 MIN: CPT | Performed by: NURSE PRACTITIONER

## 2018-12-10 PROCEDURE — 86618 LYME DISEASE ANTIBODY: CPT | Performed by: NURSE PRACTITIONER

## 2018-12-10 PROCEDURE — 86140 C-REACTIVE PROTEIN: CPT | Performed by: NURSE PRACTITIONER

## 2018-12-10 PROCEDURE — 85652 RBC SED RATE AUTOMATED: CPT | Performed by: NURSE PRACTITIONER

## 2018-12-10 PROCEDURE — 36415 COLL VENOUS BLD VENIPUNCTURE: CPT | Performed by: NURSE PRACTITIONER

## 2018-12-10 RX ORDER — DOXYCYCLINE 100 MG/1
100 CAPSULE ORAL 2 TIMES DAILY
Qty: 28 CAPSULE | Refills: 0 | Status: ON HOLD | OUTPATIENT
Start: 2018-12-10 | End: 2019-02-08

## 2018-12-10 ASSESSMENT — PAIN SCALES - GENERAL: PAINLEVEL: EXTREME PAIN (8)

## 2018-12-10 NOTE — PATIENT INSTRUCTIONS
Doxycycline twice daily for 14 days  Probiotic or yogurt daily  Will call with labs  Monitor your blood pressure daily-call me next week with results  If consistently 140/90 or higher, restart your blood pressure medication

## 2018-12-10 NOTE — NURSING NOTE
Patient presents to clinic today for joint pain and fatigue. She states she was at the [facility] and she was discussing symptoms with nurse and they figured it was lyme disease.     No LMP recorded. Patient has had an ablation.  Medication Reconciliation: complete    Kathy Diego LPN  12/10/2018 12:57 PM

## 2018-12-10 NOTE — PROGRESS NOTES
"HPI:    Crystal Ferrara is a 48 year old female who presents to clinic today for multiple concerns. She is having joint pain and fatigue. Joint pain worse in back and lower body. Reports being at the [place] clinic recently and the nurse mentioned this was Lyme disease. Reports hot/cold sweats. Denies any rashes. Feels achy. She has had sx for about 1 1/2 months. Reports a tick bite on left ear, states she had a \"target on the ear\". She has used advil for sx.     She has been monitoring her BP at home, reports this has been good at home. Running around 115/80, has not taken this recently. Stopped taking the medication as her blood pressure was back to normal.     Past Medical History:   Diagnosis Date     Excessive and frequent menstruation with regular cycle     2013     Personal history of other medical treatment (CODE)     .     Personal history of other medical treatment (CODE)     No Comments Provided       Past Surgical History:   Procedure Laterality Date     APPENDECTOMY OPEN      No Comments Provided     TONSILLECTOMY      No Comments Provided       Family History   Problem Relation Age of Onset     Family History Negative Mother         Good Health     Hypertension Mother         Hypertension     Family History Negative Father         Good Health     Family History Negative Sister         Good Health     Family History Negative Son         Good Health     Family History Negative Daughter         Good Health     Breast Cancer Maternal Aunt         Cancer-breast     Breast Cancer Paternal Aunt         Cancer-breast       Social History     Socioeconomic History     Marital status:      Spouse name: Not on file     Number of children: Not on file     Years of education: Not on file     Highest education level: Not on file   Social Needs     Financial resource strain: Not on file     Food insecurity - worry: Not on file     Food insecurity - inability: Not on file     Transportation needs " - medical: Not on file     Transportation needs - non-medical: Not on file   Occupational History     Not on file   Tobacco Use     Smoking status: Current Some Day Smoker     Packs/day: 0.25     Years: 8.00     Pack years: 2.00     Types: Cigarettes     Last attempt to quit: 2016     Years since quittin.2     Smokeless tobacco: Never Used   Substance and Sexual Activity     Alcohol use: Yes     Comment: Alcoholic Drinks/day: once weekly     Drug use: Unknown     Types: Other     Comment: Drug use: No     Sexual activity: Yes     Partners: Male     Birth control/protection: Pill   Other Topics Concern     Parent/sibling w/ CABG, MI or angioplasty before 65F 55M? Not Asked   Social History Narrative    ** Merged History Encounter **         ** Data from: 13 Enc Dept: [facility] INFO MGMT         ** Data from: 13 Enc Dept: [facility]    p 6/10/2013.       Current Outpatient Medications   Medication Sig Dispense Refill     doxycycline hyclate (VIBRAMYCIN) 100 MG capsule Take 1 capsule (100 mg) by mouth 2 times daily for 14 days 28 capsule 0     triamcinolone (KENALOG) 0.1 % paste Take by mouth 2 times daily 5 g 1       Allergies   Allergen Reactions     Amoxicillin Hives     Hydrochlorothiazide Blisters     Lisinopril Rash       ROS:  Pertinent positives and negatives are noted in HPI.    EXAM:  General appearance: well appearing female, in no acute distress  Respiratory: clear to auscultation bilaterally  Cardiac: RRR with no murmurs  Musculoskeletal: no joint swelling or erythema  Dermatological: no rashes or lesions  Psychological: normal affect, alert and pleasant  Lab:   Results for orders placed or performed in visit on 12/10/18   CRP inflammation   Result Value Ref Range    CRP Inflammation 0.2 <0.5 mg/L   Erythrocyte sedimentation rate auto   Result Value Ref Range    Sed Rate 17 (H) 1 - 15 mm/h   Vitamin D Total   Result Value Ref Range    Vitamin D Total 27.9 ng/mL   CBC W PLT No  Diff   Result Value Ref Range    WBC 6.6 4.0 - 11.0 10e9/L    RBC Count 4.75 3.8 - 5.2 10e12/L    Hemoglobin 15.2 11.7 - 15.7 g/dL    Hematocrit 44.6 35.0 - 47.0 %    MCV 94 78 - 100 fl    MCH 32.0 26.5 - 33.0 pg    MCHC 34.1 31.5 - 36.5 g/dL    RDW 11.7 10.0 - 15.0 %    Platelet Count 244 150 - 450 10e9/L   Basic metabolic panel  (Ca, Cl, CO2, Creat, Gluc, K, Na, BUN)   Result Value Ref Range    Sodium 136 134 - 144 mmol/L    Potassium 3.3 (L) 3.5 - 5.1 mmol/L    Chloride 99 98 - 107 mmol/L    Carbon Dioxide 30 21 - 31 mmol/L    Anion Gap 7 3 - 14 mmol/L    Glucose 88 70 - 105 mg/dL    Urea Nitrogen 8 7 - 25 mg/dL    Creatinine 0.66 0.60 - 1.20 mg/dL    GFR Estimate >90 >60 mL/min/1.7m2    GFR Estimate If Black >90 >60 mL/min/1.7m2    Calcium 9.4 8.6 - 10.3 mg/dL       ASSESSMENT AND PLAN:    1. Multiple joint pain    2. Fatigue, unspecified type    3. Essential hypertension    4. Hypertension, unspecified type      ESR mildly elevated otherwise labs are stable. Lyme test is pending. She is very worried about lyme and given hx of tick bite with bulls eye will tx with doxycycline empirically. She should restart her BP medications, she is hesitant. Plan to monitor at home and call me in a couple weeks with results. If BP remains consistently over 140/90 will restart medications.       Pau Rashid..................12/10/2018 12:56 PM

## 2018-12-12 LAB — B BURGDOR IGG+IGM SER QL: 0.16 (ref 0–0.89)

## 2018-12-13 ENCOUNTER — TELEPHONE (OUTPATIENT)
Dept: FAMILY MEDICINE | Facility: OTHER | Age: 48
End: 2018-12-13

## 2018-12-13 NOTE — TELEPHONE ENCOUNTER
----- Message from SALONI Fuller CNP sent at 12/13/2018 10:59 AM CST -----  Please call patient with previous result note. SALONI Pollard CNP on 12/13/2018 at 10:59 AM

## 2018-12-13 NOTE — TELEPHONE ENCOUNTER
----- Message from SALONI Fuller CNP sent at 12/13/2018  7:49 AM CST -----  Please let her know that her lyme test is negative. SALONI Pollard CNP on 12/13/2018 at 7:49 AM

## 2018-12-13 NOTE — TELEPHONE ENCOUNTER
Called patient with results after giving last name and date of birth.  Juan Pena LPN ..............12/13/2018 12:40 PM

## 2018-12-13 NOTE — TELEPHONE ENCOUNTER
Attempted to reach patient but got voicemail with patient's first and last name. Let results and told patient to call back with any questions.  Juan Pena LPN ..............12/13/2018 11:14 AM

## 2019-02-07 ENCOUNTER — APPOINTMENT (OUTPATIENT)
Dept: CT IMAGING | Facility: OTHER | Age: 49
End: 2019-02-07
Attending: FAMILY MEDICINE
Payer: COMMERCIAL

## 2019-02-07 ENCOUNTER — HOSPITAL ENCOUNTER (INPATIENT)
Facility: OTHER | Age: 49
LOS: 1 days | Discharge: HOME | End: 2019-02-09
Attending: FAMILY MEDICINE | Admitting: FAMILY MEDICINE
Payer: COMMERCIAL

## 2019-02-07 ENCOUNTER — APPOINTMENT (OUTPATIENT)
Dept: ULTRASOUND IMAGING | Facility: OTHER | Age: 49
End: 2019-02-07
Attending: FAMILY MEDICINE
Payer: COMMERCIAL

## 2019-02-07 ENCOUNTER — TELEPHONE (OUTPATIENT)
Dept: FAMILY MEDICINE | Facility: OTHER | Age: 49
End: 2019-02-07

## 2019-02-07 DIAGNOSIS — M54.50 ACUTE RIGHT-SIDED LOW BACK PAIN WITHOUT SCIATICA: ICD-10-CM

## 2019-02-07 DIAGNOSIS — F17.210 CIGARETTE SMOKER: ICD-10-CM

## 2019-02-07 DIAGNOSIS — R06.02 SOB (SHORTNESS OF BREATH): ICD-10-CM

## 2019-02-07 DIAGNOSIS — N12 PYELONEPHRITIS: ICD-10-CM

## 2019-02-07 DIAGNOSIS — I10 ESSENTIAL HYPERTENSION, MALIGNANT: ICD-10-CM

## 2019-02-07 DIAGNOSIS — M54.50 LOW BACK PAIN WITHOUT SCIATICA, UNSPECIFIED BACK PAIN LATERALITY, UNSPECIFIED CHRONICITY: ICD-10-CM

## 2019-02-07 LAB
ALBUMIN UR-MCNC: NEGATIVE MG/DL
ANION GAP SERPL CALCULATED.3IONS-SCNC: 13 MMOL/L (ref 3–14)
APPEARANCE UR: CLEAR
BACTERIA #/AREA URNS HPF: ABNORMAL /HPF
BASOPHILS # BLD AUTO: 0 10E9/L (ref 0–0.2)
BASOPHILS NFR BLD AUTO: 0.5 %
BILIRUB UR QL STRIP: NEGATIVE
BUN SERPL-MCNC: 20 MG/DL (ref 7–25)
CALCIUM SERPL-MCNC: 9.1 MG/DL (ref 8.6–10.3)
CHLORIDE SERPL-SCNC: 101 MMOL/L (ref 98–107)
CO2 SERPL-SCNC: 20 MMOL/L (ref 21–31)
COLOR UR AUTO: YELLOW
CREAT SERPL-MCNC: 1.01 MG/DL (ref 0.6–1.2)
CRP SERPL-MCNC: 1.1 MG/L
D DIMER PPP DDU-MCNC: 263 NG/ML D-DU (ref 0–230)
DEPRECATED S PYO AG THROAT QL EIA: NORMAL
DIFFERENTIAL METHOD BLD: NORMAL
EOSINOPHIL # BLD AUTO: 0.1 10E9/L (ref 0–0.7)
EOSINOPHIL NFR BLD AUTO: 1.4 %
ERYTHROCYTE [DISTWIDTH] IN BLOOD BY AUTOMATED COUNT: 12.2 % (ref 10–15)
FLUAV+FLUBV RNA SPEC QL NAA+PROBE: NEGATIVE
FLUAV+FLUBV RNA SPEC QL NAA+PROBE: NEGATIVE
GFR SERPL CREATININE-BSD FRML MDRD: 59 ML/MIN/{1.73_M2}
GLUCOSE SERPL-MCNC: 177 MG/DL (ref 70–105)
GLUCOSE UR STRIP-MCNC: NEGATIVE MG/DL
HCT VFR BLD AUTO: 39.4 % (ref 35–47)
HGB BLD-MCNC: 13.9 G/DL (ref 11.7–15.7)
HGB UR QL STRIP: ABNORMAL
IMM GRANULOCYTES # BLD: 0 10E9/L (ref 0–0.4)
IMM GRANULOCYTES NFR BLD: 0.4 %
KETONES UR STRIP-MCNC: ABNORMAL MG/DL
LACTATE SERPL-SCNC: 2.3 MMOL/L (ref 0.5–2.2)
LACTATE SERPL-SCNC: 2.7 MMOL/L (ref 0.5–2.2)
LEUKOCYTE ESTERASE UR QL STRIP: NEGATIVE
LYMPHOCYTES # BLD AUTO: 1.9 10E9/L (ref 0.8–5.3)
LYMPHOCYTES NFR BLD AUTO: 25.4 %
MCH RBC QN AUTO: 32.6 PG (ref 26.5–33)
MCHC RBC AUTO-ENTMCNC: 35.3 G/DL (ref 31.5–36.5)
MCV RBC AUTO: 92 FL (ref 78–100)
MONOCYTES # BLD AUTO: 0.6 10E9/L (ref 0–1.3)
MONOCYTES NFR BLD AUTO: 8.3 %
NEUTROPHILS # BLD AUTO: 4.7 10E9/L (ref 1.6–8.3)
NEUTROPHILS NFR BLD AUTO: 64 %
NITRATE UR QL: NEGATIVE
NON-SQ EPI CELLS #/AREA URNS LPF: ABNORMAL /LPF
PH UR STRIP: 5.5 PH (ref 5–9)
PLATELET # BLD AUTO: 290 10E9/L (ref 150–450)
POTASSIUM SERPL-SCNC: 3.7 MMOL/L (ref 3.5–5.1)
RBC # BLD AUTO: 4.27 10E12/L (ref 3.8–5.2)
RBC #/AREA URNS AUTO: ABNORMAL /HPF
RSV RNA SPEC NAA+PROBE: NEGATIVE
SODIUM SERPL-SCNC: 134 MMOL/L (ref 134–144)
SOURCE: ABNORMAL
SP GR UR STRIP: 1.02 (ref 1–1.03)
SPECIMEN SOURCE: NORMAL
SPECIMEN SOURCE: NORMAL
UROBILINOGEN UR STRIP-ACNC: 0.2 EU/DL (ref 0.2–1)
WBC # BLD AUTO: 7.4 10E9/L (ref 4–11)
WBC #/AREA URNS AUTO: ABNORMAL /HPF

## 2019-02-07 PROCEDURE — 25000128 H RX IP 250 OP 636: Performed by: FAMILY MEDICINE

## 2019-02-07 PROCEDURE — 99285 EMERGENCY DEPT VISIT HI MDM: CPT | Mod: 25 | Performed by: FAMILY MEDICINE

## 2019-02-07 PROCEDURE — 83605 ASSAY OF LACTIC ACID: CPT | Performed by: FAMILY MEDICINE

## 2019-02-07 PROCEDURE — 74176 CT ABD & PELVIS W/O CONTRAST: CPT

## 2019-02-07 PROCEDURE — 96366 THER/PROPH/DIAG IV INF ADDON: CPT | Performed by: FAMILY MEDICINE

## 2019-02-07 PROCEDURE — 96365 THER/PROPH/DIAG IV INF INIT: CPT | Performed by: FAMILY MEDICINE

## 2019-02-07 PROCEDURE — 87040 BLOOD CULTURE FOR BACTERIA: CPT | Performed by: FAMILY MEDICINE

## 2019-02-07 PROCEDURE — 99285 EMERGENCY DEPT VISIT HI MDM: CPT | Mod: Z6 | Performed by: FAMILY MEDICINE

## 2019-02-07 PROCEDURE — 83605 ASSAY OF LACTIC ACID: CPT | Mod: 91 | Performed by: FAMILY MEDICINE

## 2019-02-07 PROCEDURE — 87040 BLOOD CULTURE FOR BACTERIA: CPT | Mod: 91 | Performed by: FAMILY MEDICINE

## 2019-02-07 PROCEDURE — 76705 ECHO EXAM OF ABDOMEN: CPT | Mod: TC

## 2019-02-07 PROCEDURE — 25500064 ZZH RX 255 OP 636: Performed by: FAMILY MEDICINE

## 2019-02-07 PROCEDURE — 85025 COMPLETE CBC W/AUTO DIFF WBC: CPT | Performed by: FAMILY MEDICINE

## 2019-02-07 PROCEDURE — 96375 TX/PRO/DX INJ NEW DRUG ADDON: CPT | Performed by: FAMILY MEDICINE

## 2019-02-07 PROCEDURE — 96361 HYDRATE IV INFUSION ADD-ON: CPT | Performed by: FAMILY MEDICINE

## 2019-02-07 PROCEDURE — 25000132 ZZH RX MED GY IP 250 OP 250 PS 637: Performed by: FAMILY MEDICINE

## 2019-02-07 PROCEDURE — 86140 C-REACTIVE PROTEIN: CPT | Performed by: FAMILY MEDICINE

## 2019-02-07 PROCEDURE — 36415 COLL VENOUS BLD VENIPUNCTURE: CPT | Performed by: FAMILY MEDICINE

## 2019-02-07 PROCEDURE — 87631 RESP VIRUS 3-5 TARGETS: CPT | Performed by: FAMILY MEDICINE

## 2019-02-07 PROCEDURE — 84145 PROCALCITONIN (PCT): CPT | Performed by: FAMILY MEDICINE

## 2019-02-07 PROCEDURE — 87880 STREP A ASSAY W/OPTIC: CPT | Performed by: FAMILY MEDICINE

## 2019-02-07 PROCEDURE — 71260 CT THORAX DX C+: CPT | Mod: TC

## 2019-02-07 PROCEDURE — 81001 URINALYSIS AUTO W/SCOPE: CPT | Performed by: FAMILY MEDICINE

## 2019-02-07 PROCEDURE — 80048 BASIC METABOLIC PNL TOTAL CA: CPT | Performed by: FAMILY MEDICINE

## 2019-02-07 PROCEDURE — 96367 TX/PROPH/DG ADDL SEQ IV INF: CPT | Performed by: FAMILY MEDICINE

## 2019-02-07 PROCEDURE — 36415 COLL VENOUS BLD VENIPUNCTURE: CPT | Mod: 91 | Performed by: FAMILY MEDICINE

## 2019-02-07 PROCEDURE — 85379 FIBRIN DEGRADATION QUANT: CPT | Performed by: FAMILY MEDICINE

## 2019-02-07 RX ORDER — KETOROLAC TROMETHAMINE 30 MG/ML
30 INJECTION, SOLUTION INTRAMUSCULAR; INTRAVENOUS ONCE
Status: COMPLETED | OUTPATIENT
Start: 2019-02-07 | End: 2019-02-07

## 2019-02-07 RX ORDER — FENTANYL CITRATE 50 UG/ML
50 INJECTION, SOLUTION INTRAMUSCULAR; INTRAVENOUS ONCE
Status: COMPLETED | OUTPATIENT
Start: 2019-02-07 | End: 2019-02-07

## 2019-02-07 RX ORDER — CEFEPIME HYDROCHLORIDE 2 G/1
2 INJECTION, POWDER, FOR SOLUTION INTRAVENOUS ONCE
Status: COMPLETED | OUTPATIENT
Start: 2019-02-07 | End: 2019-02-07

## 2019-02-07 RX ORDER — ACETAMINOPHEN 500 MG
1000 TABLET ORAL ONCE
Status: COMPLETED | OUTPATIENT
Start: 2019-02-07 | End: 2019-02-07

## 2019-02-07 RX ORDER — MORPHINE SULFATE 4 MG/ML
4 INJECTION, SOLUTION INTRAMUSCULAR; INTRAVENOUS
Status: COMPLETED | OUTPATIENT
Start: 2019-02-07 | End: 2019-02-07

## 2019-02-07 RX ORDER — IODIXANOL 320 MG/ML
100 INJECTION, SOLUTION INTRAVASCULAR ONCE
Status: COMPLETED | OUTPATIENT
Start: 2019-02-07 | End: 2019-02-07

## 2019-02-07 RX ORDER — CEFTRIAXONE SODIUM 1 G/50ML
1 INJECTION, SOLUTION INTRAVENOUS ONCE
Status: COMPLETED | OUTPATIENT
Start: 2019-02-07 | End: 2019-02-07

## 2019-02-07 RX ADMIN — FENTANYL CITRATE 50 MCG: 50 INJECTION, SOLUTION INTRAMUSCULAR; INTRAVENOUS at 21:36

## 2019-02-07 RX ADMIN — CEFTRIAXONE SODIUM 1 G: 1 INJECTION, SOLUTION INTRAVENOUS at 20:39

## 2019-02-07 RX ADMIN — SODIUM CHLORIDE 1000 ML: 900 INJECTION, SOLUTION INTRAVENOUS at 20:23

## 2019-02-07 RX ADMIN — ACETAMINOPHEN 1000 MG: 500 TABLET, FILM COATED ORAL at 21:37

## 2019-02-07 RX ADMIN — CEFEPIME HYDROCHLORIDE 2 G: 2 INJECTION, POWDER, FOR SOLUTION INTRAVENOUS at 23:25

## 2019-02-07 RX ADMIN — MORPHINE SULFATE 4 MG: 4 INJECTION INTRAVENOUS at 23:44

## 2019-02-07 RX ADMIN — KETOROLAC TROMETHAMINE 30 MG: 30 INJECTION, SOLUTION INTRAMUSCULAR at 20:24

## 2019-02-07 RX ADMIN — IODIXANOL 100 ML: 320 INJECTION, SOLUTION INTRAVASCULAR at 22:55

## 2019-02-07 RX ADMIN — SODIUM CHLORIDE 1000 ML: 900 INJECTION, SOLUTION INTRAVENOUS at 22:05

## 2019-02-07 ASSESSMENT — ENCOUNTER SYMPTOMS
CHILLS: 1
FATIGUE: 1
SHORTNESS OF BREATH: 0
CONFUSION: 0
DIAPHORESIS: 0
DIARRHEA: 0
COUGH: 0
ABDOMINAL PAIN: 0
FEVER: 1
PALPITATIONS: 0
VOMITING: 0
NAUSEA: 0
WEAKNESS: 0
SORE THROAT: 0
BACK PAIN: 1
DYSURIA: 0
FREQUENCY: 0
FLANK PAIN: 1
ABDOMINAL DISTENTION: 0
DIFFICULTY URINATING: 0

## 2019-02-07 ASSESSMENT — MIFFLIN-ST. JEOR: SCORE: 1218.75

## 2019-02-07 NOTE — TELEPHONE ENCOUNTER
Patient calling in regards to being seen by Dr. Gan in [place]. She was diagnosed with a kidney infection, but symptoms aren't improving and was told to follow up if not improved. She states it hasn't and because Pau HIGH is out of the office she is going to go to [facility].     Kathy Diego LPN...................2/7/2019  3:03 PM

## 2019-02-08 PROBLEM — I10 ESSENTIAL HYPERTENSION: Status: ACTIVE | Noted: 2018-03-05

## 2019-02-08 PROBLEM — N12 PYELONEPHRITIS: Status: ACTIVE | Noted: 2019-02-08

## 2019-02-08 LAB — PROCALCITONIN SERPL-MCNC: <0.05 NG/ML

## 2019-02-08 PROCEDURE — 25000128 H RX IP 250 OP 636: Performed by: FAMILY MEDICINE

## 2019-02-08 PROCEDURE — 25000132 ZZH RX MED GY IP 250 OP 250 PS 637: Performed by: FAMILY MEDICINE

## 2019-02-08 PROCEDURE — 12000000 ZZH R&B MED SURG/OB

## 2019-02-08 PROCEDURE — 99222 1ST HOSP IP/OBS MODERATE 55: CPT | Performed by: FAMILY MEDICINE

## 2019-02-08 PROCEDURE — G0378 HOSPITAL OBSERVATION PER HR: HCPCS

## 2019-02-08 RX ORDER — HYDROCODONE BITARTRATE AND ACETAMINOPHEN 5; 325 MG/1; MG/1
1-2 TABLET ORAL EVERY 4 HOURS PRN
Status: DISCONTINUED | OUTPATIENT
Start: 2019-02-08 | End: 2019-02-09 | Stop reason: HOSPADM

## 2019-02-08 RX ORDER — CEFTRIAXONE SODIUM 1 G/50ML
1 INJECTION, SOLUTION INTRAVENOUS AT BEDTIME
Status: DISCONTINUED | OUTPATIENT
Start: 2019-02-08 | End: 2019-02-09 | Stop reason: HOSPADM

## 2019-02-08 RX ORDER — KETOROLAC TROMETHAMINE 30 MG/ML
30 INJECTION, SOLUTION INTRAMUSCULAR; INTRAVENOUS EVERY 6 HOURS PRN
Status: DISCONTINUED | OUTPATIENT
Start: 2019-02-08 | End: 2019-02-09 | Stop reason: HOSPADM

## 2019-02-08 RX ORDER — SULFAMETHOXAZOLE/TRIMETHOPRIM 800-160 MG
1 TABLET ORAL 2 TIMES DAILY
Status: ON HOLD | COMMUNITY
Start: 2019-02-01 | End: 2019-02-09

## 2019-02-08 RX ORDER — SODIUM CHLORIDE 9 MG/ML
INJECTION, SOLUTION INTRAVENOUS CONTINUOUS
Status: DISCONTINUED | OUTPATIENT
Start: 2019-02-08 | End: 2019-02-08

## 2019-02-08 RX ORDER — ONDANSETRON 4 MG/1
4 TABLET, ORALLY DISINTEGRATING ORAL EVERY 6 HOURS PRN
Status: DISCONTINUED | OUTPATIENT
Start: 2019-02-08 | End: 2019-02-08

## 2019-02-08 RX ORDER — ONDANSETRON 4 MG/1
4 TABLET, ORALLY DISINTEGRATING ORAL EVERY 6 HOURS PRN
Status: DISCONTINUED | OUTPATIENT
Start: 2019-02-08 | End: 2019-02-09 | Stop reason: HOSPADM

## 2019-02-08 RX ORDER — NALOXONE HYDROCHLORIDE 0.4 MG/ML
.1-.4 INJECTION, SOLUTION INTRAMUSCULAR; INTRAVENOUS; SUBCUTANEOUS
Status: DISCONTINUED | OUTPATIENT
Start: 2019-02-08 | End: 2019-02-09 | Stop reason: HOSPADM

## 2019-02-08 RX ORDER — ACETAMINOPHEN 325 MG/1
650 TABLET ORAL EVERY 4 HOURS PRN
Status: DISCONTINUED | OUTPATIENT
Start: 2019-02-08 | End: 2019-02-09 | Stop reason: HOSPADM

## 2019-02-08 RX ORDER — ACETAMINOPHEN 325 MG/1
650 TABLET ORAL EVERY 4 HOURS PRN
Status: DISCONTINUED | OUTPATIENT
Start: 2019-02-08 | End: 2019-02-08

## 2019-02-08 RX ORDER — NALOXONE HYDROCHLORIDE 0.4 MG/ML
.1-.4 INJECTION, SOLUTION INTRAMUSCULAR; INTRAVENOUS; SUBCUTANEOUS
Status: DISCONTINUED | OUTPATIENT
Start: 2019-02-08 | End: 2019-02-08

## 2019-02-08 RX ORDER — ONDANSETRON 2 MG/ML
4 INJECTION INTRAMUSCULAR; INTRAVENOUS EVERY 6 HOURS PRN
Status: DISCONTINUED | OUTPATIENT
Start: 2019-02-08 | End: 2019-02-09 | Stop reason: HOSPADM

## 2019-02-08 RX ORDER — IBUPROFEN 200 MG
400 TABLET ORAL EVERY 6 HOURS PRN
COMMUNITY

## 2019-02-08 RX ORDER — ONDANSETRON 2 MG/ML
4 INJECTION INTRAMUSCULAR; INTRAVENOUS EVERY 6 HOURS PRN
Status: DISCONTINUED | OUTPATIENT
Start: 2019-02-08 | End: 2019-02-08

## 2019-02-08 RX ORDER — AMOXICILLIN 250 MG
1 CAPSULE ORAL 2 TIMES DAILY PRN
Status: DISCONTINUED | OUTPATIENT
Start: 2019-02-08 | End: 2019-02-09 | Stop reason: HOSPADM

## 2019-02-08 RX ORDER — OXYCODONE AND ACETAMINOPHEN 5; 325 MG/1; MG/1
1 TABLET ORAL EVERY 4 HOURS PRN
Status: DISCONTINUED | OUTPATIENT
Start: 2019-02-08 | End: 2019-02-09 | Stop reason: HOSPADM

## 2019-02-08 RX ORDER — MULTIVIT-MIN/IRON/FOLIC/HRB186 3.3 MG-25
1 TABLET ORAL
Status: ON HOLD | COMMUNITY
End: 2019-02-09

## 2019-02-08 RX ORDER — MORPHINE SULFATE 2 MG/ML
2-4 INJECTION, SOLUTION INTRAMUSCULAR; INTRAVENOUS
Status: DISCONTINUED | OUTPATIENT
Start: 2019-02-08 | End: 2019-02-08

## 2019-02-08 RX ORDER — IBUPROFEN 600 MG/1
600 TABLET, FILM COATED ORAL EVERY 6 HOURS PRN
Status: DISCONTINUED | OUTPATIENT
Start: 2019-02-08 | End: 2019-02-09 | Stop reason: HOSPADM

## 2019-02-08 RX ORDER — SODIUM CHLORIDE 9 MG/ML
INJECTION, SOLUTION INTRAVENOUS CONTINUOUS
Status: DISCONTINUED | OUTPATIENT
Start: 2019-02-08 | End: 2019-02-09 | Stop reason: HOSPADM

## 2019-02-08 RX ORDER — AMOXICILLIN 250 MG
2 CAPSULE ORAL 2 TIMES DAILY PRN
Status: DISCONTINUED | OUTPATIENT
Start: 2019-02-08 | End: 2019-02-09 | Stop reason: HOSPADM

## 2019-02-08 RX ADMIN — MORPHINE SULFATE 2 MG: 2 INJECTION, SOLUTION INTRAMUSCULAR; INTRAVENOUS at 09:05

## 2019-02-08 RX ADMIN — SODIUM CHLORIDE: 900 INJECTION, SOLUTION INTRAVENOUS at 08:29

## 2019-02-08 RX ADMIN — CEFTRIAXONE SODIUM 1 G: 1 INJECTION, SOLUTION INTRAVENOUS at 21:05

## 2019-02-08 RX ADMIN — SODIUM CHLORIDE: 900 INJECTION, SOLUTION INTRAVENOUS at 17:48

## 2019-02-08 RX ADMIN — SODIUM CHLORIDE 1500 MG: 900 INJECTION, SOLUTION INTRAVENOUS at 00:06

## 2019-02-08 RX ADMIN — OXYCODONE AND ACETAMINOPHEN 1 TABLET: 5; 325 TABLET ORAL at 21:03

## 2019-02-08 RX ADMIN — OXYCODONE AND ACETAMINOPHEN 1 TABLET: 5; 325 TABLET ORAL at 17:16

## 2019-02-08 RX ADMIN — MORPHINE SULFATE 2 MG: 2 INJECTION, SOLUTION INTRAMUSCULAR; INTRAVENOUS at 02:57

## 2019-02-08 ASSESSMENT — ACTIVITIES OF DAILY LIVING (ADL)
SWALLOWING: 0-->SWALLOWS FOODS/LIQUIDS WITHOUT DIFFICULTY
FALL_HISTORY_WITHIN_LAST_SIX_MONTHS: NO
BATHING: 0-->INDEPENDENT
TOILETING: 0-->INDEPENDENT
ADLS_ACUITY_SCORE: 10
ADLS_ACUITY_SCORE: 10
AMBULATION: 0-->INDEPENDENT
ADLS_ACUITY_SCORE: 10
DRESS: 0-->INDEPENDENT
RETIRED_EATING: 0-->INDEPENDENT
TRANSFERRING: 0-->INDEPENDENT
COGNITION: 0 - NO COGNITION ISSUES REPORTED
RETIRED_COMMUNICATION: 0-->UNDERSTANDS/COMMUNICATES WITHOUT DIFFICULTY

## 2019-02-08 ASSESSMENT — MIFFLIN-ST. JEOR: SCORE: 1265.45

## 2019-02-08 NOTE — ED TRIAGE NOTES
"Pt presents to ED with \"kidney\"  Pt is currently being treated for a UTI. Pt is on bactrim. Pt reports increased pain which now has spread to both sides of her back.  /81   Pulse 124   Temp 98.1  F (36.7  C) (Tympanic)   Resp 20   Ht 1.575 m (5' 2\")   Wt 63 kg (139 lb)   SpO2 97%   Breastfeeding? No   BMI 25.42 kg/m      Heather Maria RN on 2/7/2019 at 7:58 PM    "

## 2019-02-08 NOTE — PHARMACY-VANCOMYCIN DOSING SERVICE
"Pharmacy Consult- Vancomycin Assessment    Crystal Ferrara is a 48 year old female admitted on 2019.    Vancomycin has been ordered per MD, for the indication of: Sepsis    Current Antibiotic Regimen Includes: Vancomycin    Patient Active Problem List   Diagnosis     Dysmenorrhea     Heavy menses     Iron deficiency anemia due to chronic blood loss     Essential hypertension     Anxiety     Pyelonephritis       Allergies (and reaction): Bees; Paroxetine; Amoxicillin; Hydrochlorothiazide; and Lisinopril    Most recent flowsheet Weight: 67.7 kg (149 lb 4.7 oz)  Most recent flowsheet Height: 157.5 cm (5' 2\")      Intake/Output Summary (Last 24 hours) at 2019 0920  Last data filed at 2019 0900  Gross per 24 hour   Intake 300 ml   Output 550 ml   Net -250 ml       Tmax = Temp (24hrs), Av.2  F (36.8  C), Min:98.1  F (36.7  C), Max:98.3  F (36.8  C)      Recent Labs   Lab Test 19   WBC 7.4       Recent Labs   Lab Test 02/07/19  2015 12/10/18  1309 18  1639   BUN 20 8 11   CR 1.01 0.66 0.64       estimated creatinine clearance is 62.1 mL/min (based on SCr of 1.01 mg/dL).    Cultures Pending:   (19) Blood    Culture Results:   (19) Influenza = negative  (19) Rapid strep = negative  (19) Urine (from [facility]) = E. Coli pan sensitive    Vancomycin IV Administrations (past 72 hours)                   Vancomycin HCl (VANCOCIN) 1,500 mg in sodium chloride 0.9 % 500 mL intermittent infusion (mg) 1,500 mg New Bag 19 0006              Assessment/Plan: Patient admitted for pyelonephritis vs. Sepsis. Recent course of Bactrim as outpatient-started 2/1/19 x10 days. Rocephin and cefepime given x1 in ER last evening. Lactic acid last evening = 2.7.  Patient will need gram negative coverage-will discuss with hospitalist.    Regimen Start Date: 19  Loading Dose: 1500 mg, which provided 22 mg/kg/dose (dosed by night service pharmacist)  Recommended " Maintenance dose: 1000 mg, which provides 15 mg/kg/dose  Interval:Q12H  Goal Trough: 15-20 mg/L    Thank You for the consult. Will continue to follow.    Luis Miguel Borrero [facility] ....................  2/8/2019   9:20 AM

## 2019-02-08 NOTE — PHARMACY-ADMISSION MEDICATION HISTORY
"Pharmacy -- Admission Medication Reconciliation    Prior to admission (PTA) medications were reviewed and the patient's PTA medication list was updated.    Sources Consulted: Patient, [facility], [facility]    The reliability of this Medication Reconciliation is: Reliability: Reliable    The following significant changes were made:    Removed Doxycycline-patient did not take full course due to stomach issues (was prescribed \"a few months ago for lymes\")    Removed Kenalog paste-patient no longer using    Added Bactrim DS - started on 2/1/19, prescribed as a ten day course    Added Hair, skin and nails - patient only takes twice a week, states she has compliance issues and doesn't like taking pills    Added Ibuprofen - takes as needed            In addition, the patient's allergies were reviewed with the patient and updated as follows:   Allergies: Bees; Paroxetine; Amoxicillin; Hydrochlorothiazide; and Lisinopril    The pharmacist has reviewed with the patient that all personal medications should be removed from the building or locked in the belongings safe.  Patient shall only take medications ordered by the physician and administered by the nursing staff.       Medication barriers identified: compliance-patient states she doesn't like taking pills   Medication adherence concerns: compliance-patient states she doesn't like taking pills   Understanding of emergency medications: MARY JO Hdez [facility], 2/8/2019,  12:09 PM     "

## 2019-02-08 NOTE — H&P
"[facility]    History and Physical  Hospitalist       Date of Admission:  2/7/2019    Assessment & Plan   Crystal Ferrara is a 48 year old female who presents with abdominal and flank pain with recent UTI concern for pyelonephritis.     Abdominal pain concern for possible pyelonephritis given recent UTI but she is afebrile, normal WBC count and no inflammatory changes on imaging. Called [facility] re: recent urine culture which showed E coli that was pansensitive. Received cefepime and vanco in the ER. Given pansensitive organism, bactrim should be effective. Repeat cultures pending.   -continue ceftriaxone for now. Has GI upset with amoxicillin, also maybe hives of face. Has tolerated cephalosporins in the past  -pain control. She wants to eat so will try to transition to oral pain medications  -ok for general diet  -admit inpt  -routine vitals  -ok to stop IVF as she is able to eat and drink  -antiemetics prn    Essential hypertension  -continue home meds    DVT Prophylaxis: Low Risk/Ambulatory with no VTE prophylaxis indicated  Code Status: No Order    Ambika Marquez    Primary Care Physician   Pau Rashid    Chief Complaint   R sided back pain    History is obtained from the patient and chart review.    History of Present Illness   Crystal Ferrara is a 48 year old female who presents with R sided flank/back pain. She was seen about one week at at [facility], told she had a \"kidney infection\" and was given bactrim. She continued to have R sided flank pain and so called her doctor who told her to go to hospital for further work up. In ER, several imaging studies done, all reviewed, no acute process identified on CT or US. Urine did show few bacteria. She continues to have R sided flank pain this AM. Denies fever, chills, nausea or vomiting. Has never had kidney infection in the past. Had URI a couple of weeks ago, but denies significant coughing. No falls or trauma. Denies dysuria, hematuria. " "No prior hx of kidney stones. Denies diarrhea, constipation.    Past Medical History    I have reviewed this patient's medical history and updated it with pertinent information if needed.   Past Medical History:   Diagnosis Date     Excessive and frequent menstruation with regular cycle     2013     Personal history of other medical treatment (CODE)     .     Personal history of other medical treatment (CODE)     No Comments Provided       Past Surgical History   I have reviewed this patient's surgical history and updated it with pertinent information if needed.  Past Surgical History:   Procedure Laterality Date     APPENDECTOMY OPEN      No Comments Provided     TONSILLECTOMY      No Comments Provided       Prior to Admission Medications   Prior to Admission Medications   Prescriptions Last Dose Informant Patient Reported? Taking?   doxycycline hyclate (VIBRAMYCIN) 100 MG capsule   No No   Sig: Take 1 capsule (100 mg) by mouth 2 times daily for 14 days   triamcinolone (KENALOG) 0.1 % paste   No No   Sig: Take by mouth 2 times daily      Facility-Administered Medications: None     Allergies   Allergies   Allergen Reactions     Bees Anaphylaxis     Paroxetine Anaphylaxis     Pt stated\"seratonin \" toxicity  Yolanda Orellana, LPN 2019     Amoxicillin Hives     Hydrochlorothiazide Blisters     Lisinopril Rash       Social History   I have reviewed this patient's social history and updated it with pertinent information if needed. Crystal Ferrara  reports that she has been smoking cigarettes.  She has a 2.00 pack-year smoking history. she has never used smokeless tobacco. She reports that she drinks alcohol. She reports that she does not use drugs.    Family History   I have reviewed this patient's family history and updated it with pertinent information if needed.   Family History   Problem Relation Age of Onset     Family History Negative Mother         Good Health     Hypertension Mother         " Hypertension     Family History Negative Father         Good Health     Family History Negative Sister         Good Health     Family History Negative Son         Good Health     Family History Negative Daughter         Good Health     Breast Cancer Maternal Aunt         Cancer-breast     Breast Cancer Paternal Aunt         Cancer-breast       Review of Systems     REVIEW OF SYSTEMS:    Constitutional: normal energy and appetite, no recent sick contacts  Eyes: no changes in vision  Ears, nose, mouth, throat, and face: no mouth sores, dysphagia, or odynophagia  Respiratory: no shortness of breath, cough, or wheezing. No aspiration symptoms.   Cardiovascular: no chest pain, palpitations, orthopnea, increased lower extremity edema, or syncope.   Gastrointestinal: no constipation, diarrhea, nausea, vomiting or abdominal pain.  Genitourinary: no dysuria, hematuria, urgency or frequency.   Hematologic/lymphatic: no unintentional weight loss or night sweats.  Musculoskeletal: no pain to extremities or falls.   Neurological: no new weakness, tingling, numbness.   Psychiatric: no hallucinations ordelusions.  Endocrine:  not a known diabetic.      Physical Exam   Temp: 98.2  F (36.8  C) Temp src: Tympanic BP: 134/74 Pulse: 88   Resp: 18 SpO2: 99 % O2 Device: None (Room air)    Vital Signs with Ranges  Temp:  [98.1  F (36.7  C)-98.3  F (36.8  C)] 98.2  F (36.8  C)  Pulse:  [] 88  Resp:  [18-20] 18  BP: (100-138)/(53-81) 134/74  SpO2:  [94 %-99 %] 99 %  149 lbs 4.73 oz    Constitutional: AAO, NAD. Resting in bed comfortabley.   Eyes: nonicteric, noninjected. EOMI.   HEENT: NC, AT  Respiratory: CTA B/L -w/r/r  Cardiovascular: normal s1 and s2. No murmur  GI: soft, NT, ND. No flank tenderness on exam  Genitourinary:deferred  Skin: intact. Warm, dry  Musculoskeletal: normal ROM of extremities  Neurologic: no focal deficits  Psychiatric: normal affect and insight    Data   Data reviewed today:   Recent Labs   Lab  02/07/19 2015   WBC 7.4   HGB 13.9   MCV 92         POTASSIUM 3.7   CHLORIDE 101   CO2 20*   BUN 20   CR 1.01   ANIONGAP 13   NOMI 9.1   *       Recent Results (from the past 24 hour(s))   CT Abdomen Pelvis w/o Contrast    Narrative    EXAMINATION: CT ABDOMEN PELVIS W/O CONTRAST, 2/7/2019 8:36 PM    TECHNIQUE:  Helical CT images from the lung bases through the  symphysis pubis were obtained  without IV contrast. Contrast dose:    COMPARISON: none    HISTORY: Flank pain, stone disease suspected    FINDINGS:    Lung bases are clear    The liver is free of masses or biliary ductal enlargement. No  calcified gallstones are seen.    The the spleen and pancreas appear normal.    The adrenal glands are normal.    No renal or ureteric calculi are seen    The periaortic lymph nodes are normal in caliber.    No intraperitoneal masses or inflammatory changes are noted.    In the pelvis the bladder and rectum appear normal. The uterus appears  normal.     degenerative changes are present of both hips      Impression    IMPRESSION: No renal or ureteric calculi are seen. No intraperitoneal  masses or inflammatory changes are noted     VIOLETA DAVILA MD   CT Chest Pulmonary Embolism w Contrast    Narrative    EXAM:    CT Angiography Chest With Contrast     EXAM DATE/TIME:    2/7/2019 10:33 PM     CLINICAL HISTORY:    48 years old, female; Shortness of breath; Signs and symptoms; Prior surgery;   Surgery date: 6+ months; Surgery type: Appendectomy     TECHNIQUE:    Axial computed tomographic angiography images of the chest with intravenous   contrast using CT angiography protocol.    All CT scans at this facility use at least one of these dose optimization   techniques: automated exposure control; mA and/or kV adjustment per patient   size (includes targeted exams where dose is matched to clinical indication); or   iterative reconstruction.    Coronal and sagittal reformatted images were created and reviewed.     MIP reconstructed images were created and reviewed.     CONTRAST:    100 ml of Visi 320 administered intravenously.     COMPARISON:    No relevant prior studies available.     FINDINGS:    Pulmonary arteries: Normal. No pulmonary emboli.    Aorta: Normal. No aortic aneurysm. No aortic dissection.    Lungs: Normal. No consolidation. No masses.    Pleural space: Normal. No pneumothorax. No pleural effusion.    Heart: Normal. No cardiomegaly. No pericardial effusion.    Lymph nodes: Unremarkable. No enlarged lymph nodes.    Bones/joints: Unremarkable. No acute fracture.    Soft tissues: Unremarkable.       Impression    IMPRESSION:   No acute findings.     THIS DOCUMENT HAS BEEN ELECTRONICALLY SIGNED BY LAURITA LIRIANO MD   US Abdomen Limited (RUQ)    Narrative    EXAM:    US Abdomen Limited, Right Upper Quadrant     EXAM DATE/TIME:    2/7/2019 11:01 PM     CLINICAL HISTORY:    48 years old, female; Pain; Abdominal pain; Localized; Right upper quadrant   (ruq)     TECHNIQUE:    Real-time ultrasound of the abdomen with image documentation. Examination was   focused on the right upper quadrant.     COMPARISON:    No relevant prior studies available.     FINDINGS:    Liver: Normal. The liver measures 14.2 cm in length. No biliary dilation.   Gallbladder: Normal. No gallstones. The gallbladder wall measures 2 mm. No   pericholecystic fluid. Negative sonographic Chaparro's sign.   Common bile duct: The common bile duct measures 6 mm. No stones. No dilation.    Pancreas: Visualized pancreas unremarkable.  Tail not well seen due to   overlying bowel gas.   Right kidney: Normal. The right kidney measures 10.5 cm in length. No mass. No   hydronephrosis.       Impression    IMPRESSION:   No acute findings.     THIS DOCUMENT HAS BEEN ELECTRONICALLY SIGNED BY LAURITA LIRIANO MD

## 2019-02-08 NOTE — PLAN OF CARE
Pain Acute  Optimal Pain Control  2/8/2019 1511 by Maddie Jimenez, RN  Note  Pt A&O x4. Pt given 2mg morphine at 0905 for bilateral flank pain, pt satisfied with pain control. Pt currently rating flank pain 3/10, declined additional pain medications since this morning. Pt ambulating to bathroom with SBA, pt voiding well. LS clear, HR regular. Pt reports edema in face and bilaterally hands, pt was able to take ring on right hand off, ring on left hand remains in place at this time. CMS remains intact in all fingers, trace edema noted. VSS, pt tolerating regular diet well. Will continue to monitor.   Maddie Jimenez, RN on 2/8/2019 at 3:20 PM

## 2019-02-08 NOTE — ED PROVIDER NOTES
"  History   No chief complaint on file.    HPI  Crystal Ferrara is a 48 year old female who presents to ED with back/flank pain that started about 1 week ago.  She was seen by her primary care physician and diagnosed with a kidney infection and started on Bactrim double strength but is not getting better.  She called her primary care physician who told her to come into the emergency room.. The pain is located in the right flank and low back area and described as sharp, 7-8 out of 10 for severity. The pain is exacerbated by movement nothing and relieved by nothing. The patient complains of associated fever, chills, nausea. Denies associated abdominal pain vomiting, diarrhea, dysuria, hematuria.  He denies any history of similar pain. Patient denies other complaints    Allergies:  Allergies   Allergen Reactions     Bees Anaphylaxis     Paroxetine Anaphylaxis     Pt stated\"seratonin \" toxicity  Yolanda Orellana LPN 2/1/2019     Amoxicillin Hives     Hydrochlorothiazide Blisters     Lisinopril Rash       Problem List:    Patient Active Problem List    Diagnosis Date Noted     Pyelonephritis 02/08/2019     Priority: Medium     Essential hypertension 03/05/2018     Priority: Medium     Anxiety 03/05/2018     Priority: Medium     Dysmenorrhea 06/12/2013     Priority: Medium     Heavy menses 05/01/2013     Priority: Medium     Iron deficiency anemia due to chronic blood loss 05/01/2013     Priority: Medium        Past Medical History:    Past Medical History:   Diagnosis Date     Excessive and frequent menstruation with regular cycle      Personal history of other medical treatment (CODE)      Personal history of other medical treatment (CODE)        Past Surgical History:    Past Surgical History:   Procedure Laterality Date     APPENDECTOMY OPEN      No Comments Provided     TONSILLECTOMY      No Comments Provided       Family History:    Family History   Problem Relation Age of Onset     Family History Negative Mother  " "       Good Health     Hypertension Mother         Hypertension     Family History Negative Father         Good Health     Family History Negative Sister         Good Health     Family History Negative Son         Good Health     Family History Negative Daughter         Good Health     Breast Cancer Maternal Aunt         Cancer-breast     Breast Cancer Paternal Aunt         Cancer-breast       Social History:  Marital Status:   [2]  Social History     Tobacco Use     Smoking status: Current Some Day Smoker     Packs/day: 0.25     Years: 8.00     Pack years: 2.00     Types: Cigarettes     Last attempt to quit: 2016     Years since quittin.4     Smokeless tobacco: Never Used   Substance Use Topics     Alcohol use: Yes     Comment: Alcoholic Drinks/day: once weekly     Drug use: No     Comment: Drug use: No        Medications:      triamcinolone (KENALOG) 0.1 % paste         Review of Systems   Constitutional: Positive for chills, fatigue and fever. Negative for diaphoresis.   HENT: Negative for congestion and sore throat.    Respiratory: Negative for cough and shortness of breath.    Cardiovascular: Negative for chest pain, palpitations and leg swelling.   Gastrointestinal: Negative for abdominal distention, abdominal pain, diarrhea, nausea and vomiting.   Genitourinary: Positive for flank pain. Negative for difficulty urinating, dysuria and frequency.   Musculoskeletal: Positive for back pain.   Skin: Negative for pallor.   Neurological: Negative for weakness.   Psychiatric/Behavioral: Negative for confusion.   All other systems reviewed and are negative.      Physical Exam   BP: 116/81  Pulse: 124  Temp: 98.1  F (36.7  C)  Resp: 20  Height: 157.5 cm (5' 2\")  Weight: 63 kg (139 lb)  SpO2: 97 %      Physical Exam   Constitutional: She is oriented to person, place, and time. She appears well-developed and well-nourished. She is cooperative.  Non-toxic appearance. She appears ill. No distress.   HENT: "   Head: Normocephalic and atraumatic.   Right Ear: External ear normal.   Left Ear: External ear normal.   Nose: Nose normal.   Mouth/Throat: Oropharynx is clear and moist.   Eyes: Conjunctivae and EOM are normal. Pupils are equal, round, and reactive to light.   Neck: Normal range of motion. Neck supple.   Cardiovascular: Normal rate, regular rhythm, normal heart sounds and intact distal pulses.   No murmur heard.  Pulmonary/Chest: Effort normal and breath sounds normal. She has no rales.   Abdominal: Soft. Bowel sounds are normal. There is no hepatosplenomegaly. There is no tenderness. There is CVA tenderness. There is no rigidity, no rebound, no guarding, no tenderness at McBurney's point and negative Chaparro's sign.   Musculoskeletal: Normal range of motion. She exhibits no edema or tenderness.   Lymphadenopathy:     She has no cervical adenopathy.   Neurological: She is alert and oriented to person, place, and time.   Skin: Skin is warm. Capillary refill takes less than 2 seconds. No rash noted. She is not diaphoretic.   Nursing note and vitals reviewed.      ED Course     Procedures       Critical Care time:  none  The patient has signs of Severe Sepsis as evidenced by:    1. 2 SIRS criteria, AND  2. Suspected infection, AND   3. Organ dysfunction: Lactic Acid > 1.9 and tachycardia    Time severe sepsis diagnosis confirmed = 2215 as this was the time when Lactate resulted, and the level was > 1.9      3 Hour Severe Sepsis Bundle Completion:  1. Initial Lactic Acid Result:   Recent Labs   Lab Test 02/07/19 2145 02/07/19 2015   LACT 2.7* 2.3*     2. Blood Cultures before Antibiotics: Yes  3. Broad Spectrum Antibiotics Administered: Yes     Anti-infectives (From now, onward)    Start     Dose/Rate Route Frequency Ordered Stop    02/07/19 2300  Vancomycin HCl (VANCOCIN) 1,500 mg in sodium chloride 0.9 % 500 mL intermittent infusion      1,500 mg  over 2 Hours Intravenous ONCE 02/07/19 2235          4 2000 ml of  NS given  Ideal body weight: 50.1 kg (110 lb 7.2 oz)  Adjusted ideal body weight: 55.3 kg (121 lb 13.9 oz)    Severe Sepsis reassessment:  1. Repeat Lactic Acid Level: 2.7  2. MAP>65 after initial IVF bolus, will continue to monitor fluid status and vital signs  I attest to having performed a repeat sepsis exam and assessment of perfusion at 0138 and the results demonstrate no change.    Results for orders placed or performed during the hospital encounter of 02/07/19 (from the past 24 hour(s))   UA reflex to Microscopic and Culture   Result Value Ref Range    Color Urine Yellow     Appearance Urine Clear     Glucose Urine Negative NEG^Negative mg/dL    Bilirubin Urine Negative NEG^Negative    Ketones Urine Trace (A) NEG^Negative mg/dL    Specific Gravity Urine 1.025 1.000 - 1.030    Blood Urine Small (A) NEG^Negative    pH Urine 5.5 5.0 - 9.0 pH    Protein Albumin Urine Negative NEG^Negative mg/dL    Urobilinogen Urine 0.2 0.2 - 1.0 EU/dL    Nitrite Urine Negative NEG^Negative    Leukocyte Esterase Urine Negative NEG^Negative    Source Midstream Urine    Urine Microscopic   Result Value Ref Range    WBC Urine 0 - 5 OTO5^0 - 5 /HPF    RBC Urine O - 2 OTO2^O - 2 /HPF    Squamous Epithelial /LPF Urine Many (A) FEW^Few /LPF    Bacteria Urine Few (A) NEG^Negative /HPF   CBC with platelets differential   Result Value Ref Range    WBC 7.4 4.0 - 11.0 10e9/L    RBC Count 4.27 3.8 - 5.2 10e12/L    Hemoglobin 13.9 11.7 - 15.7 g/dL    Hematocrit 39.4 35.0 - 47.0 %    MCV 92 78 - 100 fl    MCH 32.6 26.5 - 33.0 pg    MCHC 35.3 31.5 - 36.5 g/dL    RDW 12.2 10.0 - 15.0 %    Platelet Count 290 150 - 450 10e9/L    Diff Method Automated Method     % Neutrophils 64.0 %    % Lymphocytes 25.4 %    % Monocytes 8.3 %    % Eosinophils 1.4 %    % Basophils 0.5 %    % Immature Granulocytes 0.4 %    Absolute Neutrophil 4.7 1.6 - 8.3 10e9/L    Absolute Lymphocytes 1.9 0.8 - 5.3 10e9/L    Absolute Monocytes 0.6 0.0 - 1.3 10e9/L    Absolute  Eosinophils 0.1 0.0 - 0.7 10e9/L    Absolute Basophils 0.0 0.0 - 0.2 10e9/L    Abs Immature Granulocytes 0.0 0 - 0.4 10e9/L   Basic metabolic panel   Result Value Ref Range    Sodium 134 134 - 144 mmol/L    Potassium 3.7 3.5 - 5.1 mmol/L    Chloride 101 98 - 107 mmol/L    Carbon Dioxide 20 (L) 21 - 31 mmol/L    Anion Gap 13 3 - 14 mmol/L    Glucose 177 (H) 70 - 105 mg/dL    Urea Nitrogen 20 7 - 25 mg/dL    Creatinine 1.01 0.60 - 1.20 mg/dL    GFR Estimate 59 (L) >60 mL/min/[1.73_m2]    GFR Estimate If Black 71 >60 mL/min/[1.73_m2]    Calcium 9.1 8.6 - 10.3 mg/dL   Lactic acid   Result Value Ref Range    Lactic Acid 2.3 (H) 0.5 - 2.2 mmol/L   D-Dimer (HI,GH)   Result Value Ref Range    D-Dimer ng/mL 263 (H) 0 - 230 ng/ml D-DU   CRP inflammation   Result Value Ref Range    CRP Inflammation 1.1 (H) <0.5 mg/L   CT Abdomen Pelvis w/o Contrast    Narrative    EXAMINATION: CT ABDOMEN PELVIS W/O CONTRAST, 2/7/2019 8:36 PM    TECHNIQUE:  Helical CT images from the lung bases through the  symphysis pubis were obtained  without IV contrast. Contrast dose:    COMPARISON: none    HISTORY: Flank pain, stone disease suspected    FINDINGS:    Lung bases are clear    The liver is free of masses or biliary ductal enlargement. No  calcified gallstones are seen.    The the spleen and pancreas appear normal.    The adrenal glands are normal.    No renal or ureteric calculi are seen    The periaortic lymph nodes are normal in caliber.    No intraperitoneal masses or inflammatory changes are noted.    In the pelvis the bladder and rectum appear normal. The uterus appears  normal.     degenerative changes are present of both hips      Impression    IMPRESSION: No renal or ureteric calculi are seen. No intraperitoneal  masses or inflammatory changes are noted     VIOLETA DAVILA MD   Lactic acid   Result Value Ref Range    Lactic Acid 2.7 (HH) 0.5 - 2.2 mmol/L   Rapid strep screen   Result Value Ref Range    Specimen Description Throat      Rapid Strep A Screen       Negative presumptive for Group A Beta Streptococcus   Influenza A and B and RSV PCR   Result Value Ref Range    Specimen Description Nasal     Influenza A PCR Negative NEG^Negative    Influenza B PCR Negative NEG^Negative    Resp Syncytial Virus Negative NEG^Negative   CT Chest Pulmonary Embolism w Contrast    Narrative    EXAM:    CT Angiography Chest With Contrast     EXAM DATE/TIME:    2/7/2019 10:33 PM     CLINICAL HISTORY:    48 years old, female; Shortness of breath; Signs and symptoms; Prior surgery;   Surgery date: 6+ months; Surgery type: Appendectomy     TECHNIQUE:    Axial computed tomographic angiography images of the chest with intravenous   contrast using CT angiography protocol.    All CT scans at this facility use at least one of these dose optimization   techniques: automated exposure control; mA and/or kV adjustment per patient   size (includes targeted exams where dose is matched to clinical indication); or   iterative reconstruction.    Coronal and sagittal reformatted images were created and reviewed.    MIP reconstructed images were created and reviewed.     CONTRAST:    100 ml of Visi 320 administered intravenously.     COMPARISON:    No relevant prior studies available.     FINDINGS:    Pulmonary arteries: Normal. No pulmonary emboli.    Aorta: Normal. No aortic aneurysm. No aortic dissection.    Lungs: Normal. No consolidation. No masses.    Pleural space: Normal. No pneumothorax. No pleural effusion.    Heart: Normal. No cardiomegaly. No pericardial effusion.    Lymph nodes: Unremarkable. No enlarged lymph nodes.    Bones/joints: Unremarkable. No acute fracture.    Soft tissues: Unremarkable.       Impression    IMPRESSION:   No acute findings.     THIS DOCUMENT HAS BEEN ELECTRONICALLY SIGNED BY LAURITA LIRIANO MD   US Abdomen Limited (RUQ)    Narrative    EXAM:    US Abdomen Limited, Right Upper Quadrant     EXAM DATE/TIME:    2/7/2019 11:01 PM     CLINICAL  HISTORY:    48 years old, female; Pain; Abdominal pain; Localized; Right upper quadrant   (ruq)     TECHNIQUE:    Real-time ultrasound of the abdomen with image documentation. Examination was   focused on the right upper quadrant.     COMPARISON:    No relevant prior studies available.     FINDINGS:    Liver: Normal. The liver measures 14.2 cm in length. No biliary dilation.   Gallbladder: Normal. No gallstones. The gallbladder wall measures 2 mm. No   pericholecystic fluid. Negative sonographic Chaparro's sign.   Common bile duct: The common bile duct measures 6 mm. No stones. No dilation.    Pancreas: Visualized pancreas unremarkable.  Tail not well seen due to   overlying bowel gas.   Right kidney: Normal. The right kidney measures 10.5 cm in length. No mass. No   hydronephrosis.       Impression    IMPRESSION:   No acute findings.     THIS DOCUMENT HAS BEEN ELECTRONICALLY SIGNED BY LAURITA LIRIANO MD       Medications   iohexol (OMNIPAQUE) 350 mg/mL solution 100 mL ( Intravenous Canceled Entry 2/7/19 2253)   Vancomycin HCl (VANCOCIN) 1,500 mg in sodium chloride 0.9 % 500 mL intermittent infusion (1,500 mg Intravenous New Bag 2/8/19 0006)   naloxone (NARCAN) injection 0.1-0.4 mg (not administered)   sodium chloride 0.9% infusion (not administered)   acetaminophen (TYLENOL) tablet 650 mg (not administered)   morphine (PF) injection 2-4 mg (not administered)   ondansetron (ZOFRAN-ODT) ODT tab 4 mg (not administered)     Or   ondansetron (ZOFRAN) injection 4 mg (not administered)   ketorolac (TORADOL) injection 30 mg (30 mg Intravenous Given 2/7/19 2024)   0.9% sodium chloride BOLUS (0 mLs Intravenous Stopped 2/7/19 2203)   cefTRIAXone in d5w (ROCEPHIN) intermittent infusion 1 g (0 g Intravenous Stopped 2/7/19 2203)   fentaNYL (PF) (SUBLIMAZE) injection 50 mcg (50 mcg Intravenous Given 2/7/19 2136)   acetaminophen (TYLENOL) tablet 1,000 mg (1,000 mg Oral Given 2/7/19 2137)   0.9% sodium chloride BOLUS (0 mLs  Intravenous Stopped 2/7/19 2323)   morphine (PF) injection 4 mg (4 mg Intravenous Given 2/7/19 2344)   ceFEPIme (MAXIPIME) 2 g in NS (2 g Intravenous Given 2/7/19 2325)   iodixanol (VISIPAQUE 320) injection 100 mL (100 mLs Intravenous Given 2/7/19 2255)       I had a discussion with the patient and the family regarding the symptoms, exam, laboratory, CT Scan results, diagnosis, and plan.  CT scan of the abdomen and pelvis is negative for kidney stones or other acute findings.  Please see report for details.  Labs are remarkable for lactate elevated slightly at 2.3.  CBC and UA are unremarkable.    2102 -patient is reevaluated continues to complain of severe right-sided flank and back pain.  She is given Tylenol and fentanyl to address this.  We will recheck her lactic acid after she receives her IV fluids.  2216 -patient's lactic acid has worsened and is now 2.7 despite getting a liter of fluids.  The patient is reevaluated and continues to complain of severe left-sided flank pain.  She remains tachycardic and rates her pain a 6-7 out of 10 for severity.  These findings are concerning to me and I have added a right upper quadrant ultrasound to evaluate for gallbladder disease and also a PE study to evaluate for possible pulmonary embolism in the right lower lobe versus pneumonia.  Patient is given additional pain medication.    Scan of the chest is negative for acute findings.  Right upper quadrant ultrasound is negative as well.  Findings consistent with partially treated pyelonephritis.  I am unable to find a source for her lactic acid elevation or pain that seems out of proportion with her laboratory evaluation CT scans.  Fact that she has such severe pain, tachycardia, lactic acid elevation I feel that the patient would benefit from admission to observation to recheck labs in the morning perhaps consider surgical consult.  Given the patient was discussed with very kindly accepted by Dr. Ribera for admission to  observation.  I answered all questions to the best of my ability.    The patient voiced understanding of the plan and was agreeable.    Assessments & Plan (with Medical Decision Making)     I have reviewed the nursing notes.    I have reviewed the findings, diagnosis, plan and need for admission with the patient.       Medication List      ASK your doctor about these medications    doxycycline hyclate 100 MG capsule  Commonly known as:  VIBRAMYCIN  100 mg, Oral, 2 TIMES DAILY  Ask about: Should I take this medication?            Final diagnoses:   Pyelonephritis   Acute right-sided low back pain without sciatica       2/7/2019   [facility]     Luis Miguel Laguna MD  02/08/19 0146

## 2019-02-09 VITALS
OXYGEN SATURATION: 100 % | HEIGHT: 62 IN | SYSTOLIC BLOOD PRESSURE: 150 MMHG | WEIGHT: 150.3 LBS | TEMPERATURE: 98.2 F | BODY MASS INDEX: 27.66 KG/M2 | DIASTOLIC BLOOD PRESSURE: 87 MMHG | RESPIRATION RATE: 16 BRPM | HEART RATE: 76 BPM

## 2019-02-09 LAB
ALBUMIN SERPL-MCNC: 3.6 G/DL (ref 3.5–5.7)
ALP SERPL-CCNC: 76 U/L (ref 34–104)
ALT SERPL W P-5'-P-CCNC: 15 U/L (ref 7–52)
ANION GAP SERPL CALCULATED.3IONS-SCNC: 6 MMOL/L (ref 3–14)
AST SERPL W P-5'-P-CCNC: 16 U/L (ref 13–39)
BASOPHILS # BLD AUTO: 0 10E9/L (ref 0–0.2)
BASOPHILS NFR BLD AUTO: 0.8 %
BILIRUB SERPL-MCNC: 0.4 MG/DL (ref 0.3–1)
BUN SERPL-MCNC: 4 MG/DL (ref 7–25)
CALCIUM SERPL-MCNC: 8.4 MG/DL (ref 8.6–10.3)
CHLORIDE SERPL-SCNC: 106 MMOL/L (ref 98–107)
CO2 SERPL-SCNC: 27 MMOL/L (ref 21–31)
CREAT SERPL-MCNC: 0.5 MG/DL (ref 0.6–1.2)
DIFFERENTIAL METHOD BLD: NORMAL
EOSINOPHIL # BLD AUTO: 0.1 10E9/L (ref 0–0.7)
EOSINOPHIL NFR BLD AUTO: 2.6 %
ERYTHROCYTE [DISTWIDTH] IN BLOOD BY AUTOMATED COUNT: 12.2 % (ref 10–15)
GFR SERPL CREATININE-BSD FRML MDRD: >90 ML/MIN/{1.73_M2}
GLUCOSE SERPL-MCNC: 90 MG/DL (ref 70–105)
HCT VFR BLD AUTO: 38.6 % (ref 35–47)
HGB BLD-MCNC: 12.9 G/DL (ref 11.7–15.7)
IMM GRANULOCYTES # BLD: 0 10E9/L (ref 0–0.4)
IMM GRANULOCYTES NFR BLD: 0.2 %
LACTATE SERPL-SCNC: 0.8 MMOL/L (ref 0.5–2.2)
LYMPHOCYTES # BLD AUTO: 1.8 10E9/L (ref 0.8–5.3)
LYMPHOCYTES NFR BLD AUTO: 35 %
MCH RBC QN AUTO: 32.3 PG (ref 26.5–33)
MCHC RBC AUTO-ENTMCNC: 33.4 G/DL (ref 31.5–36.5)
MCV RBC AUTO: 97 FL (ref 78–100)
MONOCYTES # BLD AUTO: 0.6 10E9/L (ref 0–1.3)
MONOCYTES NFR BLD AUTO: 11.5 %
NEUTROPHILS # BLD AUTO: 2.5 10E9/L (ref 1.6–8.3)
NEUTROPHILS NFR BLD AUTO: 49.9 %
PLATELET # BLD AUTO: 274 10E9/L (ref 150–450)
POTASSIUM SERPL-SCNC: 3.9 MMOL/L (ref 3.5–5.1)
PROT SERPL-MCNC: 5.8 G/DL (ref 6.4–8.9)
RBC # BLD AUTO: 3.99 10E12/L (ref 3.8–5.2)
SODIUM SERPL-SCNC: 139 MMOL/L (ref 134–144)
WBC # BLD AUTO: 5.1 10E9/L (ref 4–11)

## 2019-02-09 PROCEDURE — 90686 IIV4 VACC NO PRSV 0.5 ML IM: CPT | Performed by: FAMILY MEDICINE

## 2019-02-09 PROCEDURE — 25000128 H RX IP 250 OP 636: Performed by: FAMILY MEDICINE

## 2019-02-09 PROCEDURE — 25000132 ZZH RX MED GY IP 250 OP 250 PS 637: Performed by: FAMILY MEDICINE

## 2019-02-09 PROCEDURE — 85025 COMPLETE CBC W/AUTO DIFF WBC: CPT | Performed by: FAMILY MEDICINE

## 2019-02-09 PROCEDURE — 36415 COLL VENOUS BLD VENIPUNCTURE: CPT | Performed by: FAMILY MEDICINE

## 2019-02-09 PROCEDURE — 80053 COMPREHEN METABOLIC PANEL: CPT | Performed by: FAMILY MEDICINE

## 2019-02-09 PROCEDURE — 99239 HOSP IP/OBS DSCHRG MGMT >30: CPT | Performed by: FAMILY MEDICINE

## 2019-02-09 PROCEDURE — 83605 ASSAY OF LACTIC ACID: CPT | Performed by: FAMILY MEDICINE

## 2019-02-09 RX ORDER — ACETAMINOPHEN 325 MG/1
650 TABLET ORAL EVERY 4 HOURS PRN
Refills: 0 | COMMUNITY
Start: 2019-02-09

## 2019-02-09 RX ORDER — LEVOFLOXACIN 750 MG/1
750 TABLET, FILM COATED ORAL DAILY
Qty: 10 TABLET | Refills: 0 | Status: SHIPPED | OUTPATIENT
Start: 2019-02-09 | End: 2019-02-19

## 2019-02-09 RX ORDER — OXYCODONE AND ACETAMINOPHEN 5; 325 MG/1; MG/1
1 TABLET ORAL EVERY 4 HOURS PRN
Qty: 10 TABLET | Refills: 0 | Status: SHIPPED | OUTPATIENT
Start: 2019-02-09 | End: 2019-02-15

## 2019-02-09 RX ADMIN — OXYCODONE AND ACETAMINOPHEN 1 TABLET: 5; 325 TABLET ORAL at 01:42

## 2019-02-09 RX ADMIN — SODIUM CHLORIDE: 900 INJECTION, SOLUTION INTRAVENOUS at 03:43

## 2019-02-09 RX ADMIN — OXYCODONE AND ACETAMINOPHEN 1 TABLET: 5; 325 TABLET ORAL at 05:44

## 2019-02-09 RX ADMIN — OXYCODONE AND ACETAMINOPHEN 1 TABLET: 5; 325 TABLET ORAL at 09:50

## 2019-02-09 RX ADMIN — INFLUENZA A VIRUS A/MICHIGAN/45/2015 X-275 (H1N1) ANTIGEN (FORMALDEHYDE INACTIVATED), INFLUENZA A VIRUS A/SINGAPORE/INFIMH-16-0019/2016 IVR-186 (H3N2) ANTIGEN (FORMALDEHYDE INACTIVATED), INFLUENZA B VIRUS B/PHUKET/3073/2013 ANTIGEN (FORMALDEHYDE INACTIVATED), AND INFLUENZA B VIRUS B/MARYLAND/15/2016 BX-69A ANTIGEN (FORMALDEHYDE INACTIVATED) 0.5 ML: 15; 15; 15; 15 INJECTION, SUSPENSION INTRAMUSCULAR at 09:42

## 2019-02-09 RX ADMIN — IBUPROFEN 600 MG: 600 TABLET ORAL at 03:54

## 2019-02-09 ASSESSMENT — ACTIVITIES OF DAILY LIVING (ADL)
ADLS_ACUITY_SCORE: 10

## 2019-02-09 ASSESSMENT — MIFFLIN-ST. JEOR: SCORE: 1270.01

## 2019-02-09 NOTE — PLAN OF CARE
Pt VSS, afebrile, pain ranges from 4-7/10.  Pain meds given per order PRN.  Pt states swelling in face and hands is improved - more so in face and left hand.  Right hand still has some trace swelling and ring is still tight, however CT dye was administered through IV in right AC.  Ibuprofen was requested and given for mild pain and swelling at 0354.  Pt is A & O, uses call light appropriately, SBA.  Urinating without issues, LS clear, BS active, HR regular.  Will continue to monitor.  Rachel Mccoy RN............................ 2/9/2019 4:04 AM

## 2019-02-09 NOTE — PHARMACY - DISCHARGE MEDICATION RECONCILIATION AND EDUCATION
Pharmacy:  Discharge Counseling and Medication Reconciliation    Crystal Ferrara  [id number] [facility] [id number]  [phone] (home) [phone] (work)  48 year old female  PCP: Pau Rashid    Allergies: Bees; Paroxetine; Amoxicillin; Hydrochlorothiazide; and Lisinopril    Discharge Counseling:    Pharmacist met with patient (and/or family) today to review the medication portion of the After Visit Summary (with an emphasis on NEW medications) and to address patient's questions/concerns.    Summary of Education: Met with patient and discussed new medications Levaquin, Percocet, and Tylenol.  Patient was told to stop taking home bactrim and hair/skin/nails per MD discharge note.  Discussed frequencies, side affects, and administration of medications.  Patient in agreement.    Materials Provided:  MedCounselor sheets printed from Clinical Pharmacology on: Levaquin, Percocet, Acetaminophen    Discharge Medication Reconciliation:    Isabel Hdez RPH has reviewed the patient's discharge medication orders and has compared them to the inpatient medication administration record and to what the patient was taking prior to admission - any discrepancies have been resolved.    It has been determined that the patient has an adequate supply of medications available or which can be obtained from the patient's preferred pharmacy, which HE/SHE has confirmed as [facility].    Thank you for the consult.    Isabel Hdez RPH........February 9, 2019 9:11 AM

## 2019-02-09 NOTE — DISCHARGE SUMMARY
[facility]    Discharge Summary  Hospitalist    Date of Admission:  2/7/2019  Date of Discharge:  2/9/2019  Discharging Provider: Ambika Marquez  Date of Service (when I saw the patient): 02/09/19    Discharge Diagnoses       Essential hypertension (3/5/2018)    Pyelonephritis (2/8/2019), resolved.       History of Present Illness   Crystal Ferrara is an 48 year old female who presented with flank pain.     Hospital Course   Crystal Ferrara was admitted on 2/7/2019.  The following problems were addressed during her hospitalization:    Pyelonephritis. Patient was admitted, given IV rocephin and vanco and pain control. I called [facility] and her urine culture results were growing pansensitive E coli so vancomycin was discontinued. Patient had been on bactrim, but is not always fully compliant with medications. She had improvement but not complete resolution of her flank pain. She was afebrile and tolerating normal diet. Blood cultures negative at 2 days. Imaging was unremarkable including normal CT and US. Recommend she follow up with PCP for ongoing work up as needed. Discussed importance of compliance with treatment. She will be discharged on levaquin and was given prescription for #10 norco 5-325mg.     Ambika Marquez, DO    Significant Results and Procedures   Final blood culture pending. See imaging in Epic.     Pending Results   These results will be followed up by PCP  Unresulted Labs Ordered in the Past 30 Days of this Admission     Date and Time Order Name Status Description    2/7/2019 2003 Blood culture Preliminary     2/7/2019 2003 Blood culture Preliminary           Code Status   Full Code       Primary Care Physician   Pau Rashid    Physical Exam   Temp: 98.2  F (36.8  C) Temp src: Tympanic BP: 150/87 Pulse: 76   Resp: 16 SpO2: 100 % O2 Device: None (Room air)    Vitals:    02/07/19 1956 02/08/19 0220 02/09/19 0345   Weight: 63 kg (139 lb) 67.7 kg (149 lb 4.7 oz) 68.2 kg (150 lb  4.8 oz)     Vital Signs with Ranges  Temp:  [97.6  F (36.4  C)-98.3  F (36.8  C)] 98.2  F (36.8  C)  Pulse:  [74-94] 76  Resp:  [16] 16  BP: (120-150)/(75-87) 150/87  SpO2:  [97 %-100 %] 100 %  I/O last 3 completed shifts:  In: 4087 [P.O.:1200; I.V.:2887]  Out: 2550 [Urine:2550]    Constitutional: AAO, NAD. Resting in bed comfortabley.   Eyes: nonicteric, noninjected. EOMI.   HEENT: NC, AT  Respiratory: CTA B/L -w/r/r  Cardiovascular: normal s1 and s2. No murmur  GI: soft, NT, ND. No flank tenderness on exam  Skin: intact. Warm, dry  Musculoskeletal: normal ROM of extremities  Neurologic: no focal deficits  Psychiatric: normal affect and insight    Discharge Disposition   Discharged to home  Condition at discharge: Stable    Consultations This Hospital Stay   PHARMACY TO [facility] GENERAL IP CONSULT    Time Spent on this Encounter   I, Ambika Marquez, personally saw the patient today and spent greater than 30 minutes discharging this patient.    Discharge Orders      Reason for your hospital stay    Kidney infection     Follow-up and recommended labs and tests     Follow up with primary care provider, Pau Rashid, within 7 days for hospital follow- up.  The following labs/tests are recommended: consider repeat urinalysis with culture if still symptomatic.     Activity    Your activity upon discharge: activity as tolerated     Discharge Instructions     Full Code     Diet    Follow this diet upon discharge: Orders Placed This Encounter      Combination Diet Regular Diet Adult     Discharge Medications   Current Discharge Medication List      START taking these medications    Details   acetaminophen (TYLENOL) 325 MG tablet Take 2 tablets (650 mg) by mouth every 4 hours as needed for mild pain  Refills: 0      levofloxacin (LEVAQUIN) 750 MG tablet Take 1 tablet (750 mg) by mouth daily for 10 days  Qty: 10 tablet, Refills: 0    Associated Diagnoses: Pyelonephritis      oxyCODONE-acetaminophen (PERCOCET)  "5-325 MG tablet Take 1 tablet by mouth every 4 hours as needed for moderate to severe pain  Qty: 10 tablet, Refills: 0    Associated Diagnoses: Pyelonephritis         CONTINUE these medications which have NOT CHANGED    Details   ibuprofen (ADVIL/MOTRIN) 200 MG tablet Take 400 mg by mouth every 6 hours as needed for mild pain         STOP taking these medications       Multiple Vitamins-Minerals (HAIR SKIN & NAILS ADVANCED) TABS Comments:   Reason for Stopping:         sulfamethoxazole-trimethoprim (BACTRIM DS/SEPTRA DS) 800-160 MG tablet Comments:   Reason for Stopping:             Allergies   Allergies   Allergen Reactions     Bees Anaphylaxis     Paroxetine Anaphylaxis     Pt stated\"seratonin \" toxicity  Yolanda Pearsoni, SHERMAN 2/1/2019     Amoxicillin Hives     Hydrochlorothiazide Blisters     Lisinopril Rash     Data   Most Recent 3 CBC's:  Recent Labs   Lab Test 02/09/19  0635 02/07/19  2015 12/10/18  1309   WBC 5.1 7.4 6.6   HGB 12.9 13.9 15.2   MCV 97 92 94    290 244      Most Recent 3 BMP's:  Recent Labs   Lab Test 02/09/19  0635 02/07/19  2015 12/10/18  1309    134 136   POTASSIUM 3.9 3.7 3.3*   CHLORIDE 106 101 99   CO2 27 20* 30   BUN 4* 20 8   CR 0.50* 1.01 0.66   ANIONGAP 6 13 7   NOMI 8.4* 9.1 9.4   GLC 90 177* 88     Most Recent 2 LFT's:  Recent Labs   Lab Test 02/09/19  0635 03/23/18  1639   AST 16 47*   ALT 15 30   ALKPHOS 76 84   BILITOTAL 0.4 0.3     Most Recent INR's and Anticoagulation Dosing History:  Anticoagulation Dose History     There is no flowsheet data to display.        Most Recent 3 Troponin's:No lab results found.  Most Recent Cholesterol Panel:No lab results found.  Most Recent 6 Bacteria Isolates From Any Culture (See EPIC Reports for Culture Details):  Recent Labs   Lab Test 02/07/19 2015   CULT No growth after 12 hours  No growth after 12 hours     Most Recent TSH, T4 and A1c Labs:No lab results found.  Results for orders placed or performed during the hospital " encounter of 02/07/19   CT Abdomen Pelvis w/o Contrast    Narrative    EXAMINATION: CT ABDOMEN PELVIS W/O CONTRAST, 2/7/2019 8:36 PM    TECHNIQUE:  Helical CT images from the lung bases through the  symphysis pubis were obtained  without IV contrast. Contrast dose:    COMPARISON: none    HISTORY: Flank pain, stone disease suspected    FINDINGS:    Lung bases are clear    The liver is free of masses or biliary ductal enlargement. No  calcified gallstones are seen.    The the spleen and pancreas appear normal.    The adrenal glands are normal.    No renal or ureteric calculi are seen    The periaortic lymph nodes are normal in caliber.    No intraperitoneal masses or inflammatory changes are noted.    In the pelvis the bladder and rectum appear normal. The uterus appears  normal.     degenerative changes are present of both hips      Impression    IMPRESSION: No renal or ureteric calculi are seen. No intraperitoneal  masses or inflammatory changes are noted     VIOLETA DAVILA MD   US Abdomen Limited (RUQ)    Narrative    EXAM:    US Abdomen Limited, Right Upper Quadrant     EXAM DATE/TIME:    2/7/2019 11:01 PM     CLINICAL HISTORY:    48 years old, female; Pain; Abdominal pain; Localized; Right upper quadrant   (ruq)     TECHNIQUE:    Real-time ultrasound of the abdomen with image documentation. Examination was   focused on the right upper quadrant.     COMPARISON:    No relevant prior studies available.     FINDINGS:    Liver: Normal. The liver measures 14.2 cm in length. No biliary dilation.   Gallbladder: Normal. No gallstones. The gallbladder wall measures 2 mm. No   pericholecystic fluid. Negative sonographic Chaparro's sign.   Common bile duct: The common bile duct measures 6 mm. No stones. No dilation.    Pancreas: Visualized pancreas unremarkable.  Tail not well seen due to   overlying bowel gas.   Right kidney: Normal. The right kidney measures 10.5 cm in length. No mass. No   hydronephrosis.       Impression     IMPRESSION:   No acute findings.     THIS DOCUMENT HAS BEEN ELECTRONICALLY SIGNED BY LAURITA LIRIANO MD   CT Chest Pulmonary Embolism w Contrast    Narrative    EXAM:    CT Angiography Chest With Contrast     EXAM DATE/TIME:    2/7/2019 10:33 PM     CLINICAL HISTORY:    48 years old, female; Shortness of breath; Signs and symptoms; Prior surgery;   Surgery date: 6+ months; Surgery type: Appendectomy     TECHNIQUE:    Axial computed tomographic angiography images of the chest with intravenous   contrast using CT angiography protocol.    All CT scans at this facility use at least one of these dose optimization   techniques: automated exposure control; mA and/or kV adjustment per patient   size (includes targeted exams where dose is matched to clinical indication); or   iterative reconstruction.    Coronal and sagittal reformatted images were created and reviewed.    MIP reconstructed images were created and reviewed.     CONTRAST:    100 ml of Visi 320 administered intravenously.     COMPARISON:    No relevant prior studies available.     FINDINGS:    Pulmonary arteries: Normal. No pulmonary emboli.    Aorta: Normal. No aortic aneurysm. No aortic dissection.    Lungs: Normal. No consolidation. No masses.    Pleural space: Normal. No pneumothorax. No pleural effusion.    Heart: Normal. No cardiomegaly. No pericardial effusion.    Lymph nodes: Unremarkable. No enlarged lymph nodes.    Bones/joints: Unremarkable. No acute fracture.    Soft tissues: Unremarkable.       Impression    IMPRESSION:   No acute findings.     THIS DOCUMENT HAS BEEN ELECTRONICALLY SIGNED BY LAURITA LIRIANO MD

## 2019-02-09 NOTE — PLAN OF CARE
Pain Acute  Optimal Pain Control  2/8/2019 1803 by Maddie Jimenez, RN  Note  Pt given Percocet at 1716 for 7/10 bilateral flank pain, pt satisfied with pain control. Pt was talking with her mom, mom states that she has had swelling from CT contrast dye. Pt is wondering if contract dye could have caused swelling in hands and face. Swelling has decreased slightly, LS clear, VSS. IV infusing. Fall precautions in place.   Maddie Jimenez, RN on 2/8/2019 at 6:06 PM

## 2019-02-13 LAB
BACTERIA SPEC CULT: NORMAL
BACTERIA SPEC CULT: NORMAL
SPECIMEN SOURCE: NORMAL
SPECIMEN SOURCE: NORMAL

## 2019-02-15 ENCOUNTER — OFFICE VISIT (OUTPATIENT)
Dept: FAMILY MEDICINE | Facility: OTHER | Age: 49
End: 2019-02-15
Attending: NURSE PRACTITIONER
Payer: COMMERCIAL

## 2019-02-15 VITALS
DIASTOLIC BLOOD PRESSURE: 84 MMHG | SYSTOLIC BLOOD PRESSURE: 136 MMHG | BODY MASS INDEX: 25.42 KG/M2 | RESPIRATION RATE: 20 BRPM | HEART RATE: 84 BPM | HEIGHT: 62 IN | WEIGHT: 138.13 LBS | TEMPERATURE: 97.6 F

## 2019-02-15 DIAGNOSIS — Z09 HOSPITAL DISCHARGE FOLLOW-UP: Primary | ICD-10-CM

## 2019-02-15 PROCEDURE — 99213 OFFICE O/P EST LOW 20 MIN: CPT | Performed by: NURSE PRACTITIONER

## 2019-02-15 ASSESSMENT — ENCOUNTER SYMPTOMS
NAUSEA: 0
FREQUENCY: 0
ABDOMINAL PAIN: 0
FEVER: 0
HEMATURIA: 0
DYSURIA: 0
APPETITE CHANGE: 0
DIFFICULTY URINATING: 0
ACTIVITY CHANGE: 0
FLANK PAIN: 0
CHILLS: 0
FATIGUE: 0
VOMITING: 0

## 2019-02-15 ASSESSMENT — PAIN SCALES - GENERAL: PAINLEVEL: MILD PAIN (2)

## 2019-02-15 ASSESSMENT — MIFFLIN-ST. JEOR: SCORE: 1214.78

## 2019-02-15 NOTE — PROGRESS NOTES
"  SUBJECTIVE:   Crystal Ferrara is a 48 year old female who presents to clinic today for the following health issues:    HPI: For follow-up after hospitalization for pyelonephritis. Patient was hospitalized for -. She was sent home with bactrim and is still completing full course. She denies fevers or back pain. She denies dysuria, hematuria or frequency. She feels like she is back to normal. She reports no pain. Vitals are stable at this visit. She would like to have a note to return to work.     Patient Active Problem List    Diagnosis Date Noted     Pyelonephritis 2019     Priority: Medium     Essential hypertension 2018     Priority: Medium     Anxiety 2018     Priority: Medium     Dysmenorrhea 2013     Priority: Medium     Heavy menses 2013     Priority: Medium     Iron deficiency anemia due to chronic blood loss 2013     Priority: Medium     Past Medical History:   Diagnosis Date     Excessive and frequent menstruation with regular cycle     2013     Personal history of other medical treatment (CODE)     .     Personal history of other medical treatment (CODE)     No Comments Provided      Past Surgical History:   Procedure Laterality Date     APPENDECTOMY OPEN      No Comments Provided     TONSILLECTOMY      No Comments Provided       Review of Systems   Constitutional: Negative for activity change, appetite change, chills, fatigue and fever.   Gastrointestinal: Negative for abdominal pain, nausea and vomiting.   Genitourinary: Negative for difficulty urinating, dysuria, flank pain, frequency and hematuria.        OBJECTIVE:     /84 (BP Location: Right arm, Patient Position: Sitting, Cuff Size: Adult Regular)   Pulse 84   Temp 97.6  F (36.4  C) (Tympanic)   Resp 20   Ht 1.575 m (5' 2\")   Wt 62.7 kg (138 lb 2 oz)   Breastfeeding? No   BMI 25.26 kg/m    Body mass index is 25.26 kg/m .  Physical Exam   Constitutional: She is oriented to person, " place, and time. She appears well-developed and well-nourished. No distress.   HENT:   Head: Normocephalic.   Cardiovascular: Normal rate and regular rhythm.   No murmur heard.  Pulmonary/Chest: Effort normal and breath sounds normal. No respiratory distress. She has no wheezes. She has no rales. She exhibits no tenderness.   Abdominal: Soft. Bowel sounds are normal. She exhibits no distension. There is no tenderness. There is no CVA tenderness.   Neurological: She is alert and oriented to person, place, and time.     Diagnostic Test Results:  none     ASSESSMENT/PLAN:   1. Hospital discharge follow-up  Patient has follow-up visit today per recommendation of hospitalist. No concerns at the visit. Exam is normal and patient reports no fevers or back pain.  No need for further lab work today. Reported to stay well hydrated. Discussed that patient should be re-evaluated if return of symptoms (fever, back pain, dysuria). Complete full course of antibiotics. Return of work note completed.     Danna Duran [facility]

## 2019-02-15 NOTE — NURSING NOTE
Patient presents to clinic for hospital follow up after being admitted 02/07 thru 02/09/19 with Pyelonephritis, Hypertension and UTI.    Medication Reconciliation: complete    Rachel Mcnamara LPN

## 2020-01-30 ENCOUNTER — NURSE TRIAGE (OUTPATIENT)
Dept: FAMILY MEDICINE | Facility: OTHER | Age: 50
End: 2020-01-30

## 2020-01-30 ENCOUNTER — TELEPHONE (OUTPATIENT)
Dept: FAMILY MEDICINE | Facility: OTHER | Age: 50
End: 2020-01-30

## 2020-01-30 NOTE — TELEPHONE ENCOUNTER
"Patient returned call and was transferred to nurse triage call line. Pt verified her last name and . Pt triaged:    Additional Information    Systolic BP >= 160 OR Diastolic >= 100, and any cardiac or neurologic symptoms (e.g., chest pain, difficulty breathing, unsteady gait, blurred vision)    Answer Assessment - Initial Assessment Questions  1. BLOOD PRESSURE: \"What is the blood pressure?\" \"Did you take at least two measurements 5 minutes apart?\"  192/105    2. ONSET: \"When did you take your blood pressure?\"    3. HOW: \"How did you obtain the blood pressure?\" (e.g., visiting nurse, automatic home BP monitor)    4. HISTORY: \"Do you have a history of high blood pressure?\"  Yes    5. MEDICATIONS: \"Are you taking any medications for blood pressure?\" \"Have you missed any doses recently?\"  Allergic to several BP medications in the past including hydrochlorothiazide, chlorthalidone and lisinopril; symptoms: throat swelling and blisters inside of mouth. Able to tolerate amlodipine in the past, but went off of all medications, due to fear of side effects.    6. OTHER SYMPTOMS: \"Do you have any symptoms?\" (e.g., headache, chest pain, blurred vision, difficulty breathing, weakness)  Headache, \"can't keep a thought.\"    7. PREGNANCY: \"Is there any chance you are pregnant?\" \"When was your last menstrual period?\"  Not assessed.    Protocols used: HIGH BLOOD PRESSURE-A-OH    Protocol recommends Pt go to ED now. Pt verbalized plan to have someone else drive her to the [place] ED immediately. Rachel Choudhary RN .............. 2020  1:58 PM    "

## 2020-01-30 NOTE — TELEPHONE ENCOUNTER
Headache, cant keep a thought, 192/105 BP    Requesting to know what medications that she has taken and not allergic too.     [facility]

## 2020-01-30 NOTE — TELEPHONE ENCOUNTER
Attempted to contact patient. Left voicemail to get more information on symptoms. Micehlle Vela RN on 1/30/2020 at 10:53 AM

## 2020-01-30 NOTE — TELEPHONE ENCOUNTER
States that her blood pressure has been up and she is wanting to know if she needs to come in or if she can just get medication for it

## 2020-05-12 ENCOUNTER — TELEPHONE (OUTPATIENT)
Dept: FAMILY MEDICINE | Facility: OTHER | Age: 50
End: 2020-05-12

## 2020-05-12 RX ORDER — AMLODIPINE BESYLATE 2.5 MG/1
2.5 TABLET ORAL AT BEDTIME
COMMUNITY
Start: 2020-02-06 | End: 2020-05-13

## 2020-05-12 NOTE — TELEPHONE ENCOUNTER
Patient states she takes Amlodipine 2.5 mg once daily. She states she started to get headaches again, and her BP is running around 148/105. She is wondering if her dose could be increased? She was advised that due to not being seen in over a year, she may need to schedule an appointment. Pau JEAN-CHANI is out until next week, and Danna Duran advise?    Lissa Bauman LPN.................. 5/12/2020 4:32 PM   Headaches.   148/105

## 2020-05-12 NOTE — TELEPHONE ENCOUNTER
Likely she will need an increase in her blood pressure. I do advise a telephone visit to discuss this further first.     Danna Duran NP

## 2020-05-13 ENCOUNTER — VIRTUAL VISIT (OUTPATIENT)
Dept: FAMILY MEDICINE | Facility: OTHER | Age: 50
End: 2020-05-13
Attending: FAMILY MEDICINE

## 2020-05-13 VITALS — WEIGHT: 148 LBS | SYSTOLIC BLOOD PRESSURE: 148 MMHG | DIASTOLIC BLOOD PRESSURE: 109 MMHG | BODY MASS INDEX: 27.07 KG/M2

## 2020-05-13 DIAGNOSIS — I10 ESSENTIAL HYPERTENSION: Primary | ICD-10-CM

## 2020-05-13 PROCEDURE — 99213 OFFICE O/P EST LOW 20 MIN: CPT | Mod: 95 | Performed by: FAMILY MEDICINE

## 2020-05-13 RX ORDER — AMLODIPINE BESYLATE 5 MG/1
5 TABLET ORAL AT BEDTIME
Qty: 30 TABLET | Refills: 11 | Status: SHIPPED | OUTPATIENT
Start: 2020-05-13 | End: 2021-08-24

## 2020-05-13 ASSESSMENT — PAIN SCALES - GENERAL: PAINLEVEL: NO PAIN (0)

## 2020-05-13 NOTE — PROGRESS NOTES
"Crystal Ferrara is a 50 year old female who is being evaluated via a billable video visit.    Lissa Steiner LPN on 5/13/2020 at 4:12 PM    The patient has been notified of following:     \"This video visit will be conducted via a call between you and your physician/provider. We have found that certain health care needs can be provided without the need for an in-person physical exam.  This service lets us provide the care you need with a video conversation.  If a prescription is necessary we can send it directly to your pharmacy.  If lab work is needed we can place an order for that and you can then stop by our lab to have the test done at a later time.    Video visits are billed at different rates depending on your insurance coverage.  Please reach out to your insurance provider with any questions.    If during the course of the call the physician/provider feels a video visit is not appropriate, you will not be charged for this service.\"    Patient has given verbal consent for Video visit? Yes    How would you like to obtain your AVS? Mail a copy    Patient would like the video invitation sent by: Text to cell phone: [phone]    Will anyone else be joining your video visit? No       There are no exam notes on file for this visit.      Subjective     Crystal Ferrara is a 50 year old female who presents today via video visit for the following health issues:    HPI  Patient is being seen for a video visit. She has hypertension for which she takes amlodipine 2.5 mg daily.  She states she has been having headaches again and blood pressure has been running in the 140s over 100s and wonders about adjusting the dose.  She is not been seen for over a year and it was recommended that she make a visit.  She was seen at [facility] in February.  She had normal exam including Pap smear, STD testing, hepatitis and HIV testing and labs were all normal.  MRI of the brain was suggested but was not done.    She had been on " "lisinopril for hypertension and had angioedema. She had blisters from HCTZ.  She has tolerated amlodipine but is on a low dose.  She has been using an OTC water pill that is mainly caffeine.  Discussed that that could be contributing to headache and she will wean off those.     Video Start Time: 4:45PM      Patient Active Problem List   Diagnosis     Dysmenorrhea     Heavy menses     Iron deficiency anemia due to chronic blood loss     Essential hypertension     Anxiety     Pyelonephritis     Past Surgical History:   Procedure Laterality Date     APPENDECTOMY OPEN      No Comments Provided     TONSILLECTOMY      No Comments Provided       Social History     Tobacco Use     Smoking status: Former Smoker     Packs/day: 0.25     Years: 8.00     Pack years: 2.00     Types: Cigarettes     Last attempt to quit: 9/1/2016     Years since quitting: 3.6     Smokeless tobacco: Never Used   Substance Use Topics     Alcohol use: Yes     Comment: Alcoholic Drinks/day: once weekly     Family History   Problem Relation Age of Onset     Family History Negative Mother         Good Health     Hypertension Mother         Hypertension     Family History Negative Father         Good Health     Family History Negative Sister         Good Health     Family History Negative Son         Good Health     Family History Negative Daughter         Good Health     Breast Cancer Maternal Aunt         Cancer-breast     Breast Cancer Paternal Aunt         Cancer-breast         Current Outpatient Medications   Medication Sig Dispense Refill     amLODIPine (NORVASC) 5 MG tablet Take 1 tablet (5 mg) by mouth At Bedtime 30 tablet 11     acetaminophen (TYLENOL) 325 MG tablet Take 2 tablets (650 mg) by mouth every 4 hours as needed for mild pain  0     ibuprofen (ADVIL/MOTRIN) 200 MG tablet Take 400 mg by mouth every 6 hours as needed for mild pain       Allergies   Allergen Reactions     Bees Anaphylaxis     Paroxetine Anaphylaxis     Pt stated\"seratonin " "\" toxicity  Yolanda Orellana, LPN 2/1/2019     Sumatriptan Hives     Hives, swelling, chest tightness     Amoxicillin Hives     Contrast Dye Swelling     Hydrochlorothiazide Blisters     Lisinopril Rash     BP Readings from Last 3 Encounters:   05/13/20 (!) 148/109   02/15/19 136/84   02/09/19 150/87    Wt Readings from Last 3 Encounters:   05/13/20 67.1 kg (148 lb)   02/15/19 62.7 kg (138 lb 2 oz)   02/09/19 68.2 kg (150 lb 4.8 oz)                    Reviewed and updated as needed this visit by Provider         Review of Systems   ROS is negative except as noted above         Objective    There were no vitals taken for this visit.  Estimated body mass index is 25.26 kg/m  as calculated from the following:    Height as of 2/15/19: 1.575 m (5' 2\").    Weight as of 2/15/19: 62.7 kg (138 lb 2 oz).  Physical Exam     GENERAL: Healthy, alert and no distress  EYES: Eyes grossly normal to inspection.  No discharge or erythema, or obvious scleral/conjunctival abnormalities.  RESP: No audible wheeze, cough, or visible cyanosis.  No visible retractions or increased work of breathing.    SKIN: Visible skin clear. No significant rash, abnormal pigmentation or lesions.  NEURO: Cranial nerves grossly intact.  Mentation and speech appropriate for age.  PSYCH: Mentation appears normal, affect normal/bright, judgement and insight intact, normal speech and appearance well-groomed.      Diagnostic Test Results:  Labs reviewed in Epic        Crystal was seen today for recheck medication.    Diagnoses and all orders for this visit:    Essential hypertension  -     amLODIPine (NORVASC) 5 MG tablet; Take 1 tablet (5 mg) by mouth At Bedtime     increase amlodipine to 5 mg daily.  Check blood pressure at home.  140/90 or less is good. 130/80 or less is great.  She will keep in touch with her progress.    Wean off the caffeine slowly.  Discussed possibly using a different diuretic if needed in the future. I suspect she may not tolerate any " thiazide but might need a loop diuretic or metolazone.       Video-Visit Details    Type of service:  Video Visit    Video End Time:4:56 PM    Originating Location (pt. Location): Home    Distant Location (provider location):  [facility]     Platform used for Video Visit: Bruno    No follow-ups on file.       Art Irene MD

## 2021-03-25 ENCOUNTER — TELEPHONE (OUTPATIENT)
Dept: FAMILY MEDICINE | Facility: OTHER | Age: 51
End: 2021-03-25

## 2021-03-25 ENCOUNTER — HOSPITAL ENCOUNTER (EMERGENCY)
Facility: OTHER | Age: 51
Discharge: HOME | End: 2021-03-25
Attending: STUDENT IN AN ORGANIZED HEALTH CARE EDUCATION/TRAINING PROGRAM | Admitting: STUDENT IN AN ORGANIZED HEALTH CARE EDUCATION/TRAINING PROGRAM

## 2021-03-25 ENCOUNTER — OFFICE VISIT (OUTPATIENT)
Dept: FAMILY MEDICINE | Facility: OTHER | Age: 51
End: 2021-03-25
Attending: FAMILY MEDICINE

## 2021-03-25 ENCOUNTER — APPOINTMENT (OUTPATIENT)
Dept: GENERAL RADIOLOGY | Facility: OTHER | Age: 51
End: 2021-03-25
Attending: STUDENT IN AN ORGANIZED HEALTH CARE EDUCATION/TRAINING PROGRAM

## 2021-03-25 VITALS
HEART RATE: 97 BPM | SYSTOLIC BLOOD PRESSURE: 140 MMHG | WEIGHT: 145 LBS | DIASTOLIC BLOOD PRESSURE: 84 MMHG | OXYGEN SATURATION: 97 % | RESPIRATION RATE: 20 BRPM | BODY MASS INDEX: 26.68 KG/M2 | HEIGHT: 62 IN | TEMPERATURE: 97.5 F

## 2021-03-25 VITALS
SYSTOLIC BLOOD PRESSURE: 138 MMHG | RESPIRATION RATE: 16 BRPM | HEART RATE: 104 BPM | TEMPERATURE: 98.2 F | OXYGEN SATURATION: 97 % | HEIGHT: 62 IN | WEIGHT: 145.8 LBS | BODY MASS INDEX: 26.83 KG/M2 | DIASTOLIC BLOOD PRESSURE: 94 MMHG

## 2021-03-25 DIAGNOSIS — R11.0 NAUSEA: ICD-10-CM

## 2021-03-25 DIAGNOSIS — Z53.9 ERRONEOUS ENCOUNTER--DISREGARD: Primary | ICD-10-CM

## 2021-03-25 DIAGNOSIS — N39.0 URINARY TRACT INFECTION WITHOUT HEMATURIA, SITE UNSPECIFIED: ICD-10-CM

## 2021-03-25 DIAGNOSIS — R07.9 CHEST PAIN, UNSPECIFIED TYPE: ICD-10-CM

## 2021-03-25 DIAGNOSIS — F41.9 ANXIETY: ICD-10-CM

## 2021-03-25 DIAGNOSIS — R51.9 NONINTRACTABLE HEADACHE, UNSPECIFIED CHRONICITY PATTERN, UNSPECIFIED HEADACHE TYPE: ICD-10-CM

## 2021-03-25 DIAGNOSIS — E86.0 DEHYDRATION: ICD-10-CM

## 2021-03-25 LAB
ALBUMIN SERPL-MCNC: 4.5 G/DL (ref 3.5–5.7)
ALBUMIN UR-MCNC: 10 MG/DL
ALP SERPL-CCNC: 99 U/L (ref 34–104)
ALT SERPL W P-5'-P-CCNC: 16 U/L (ref 7–52)
ANION GAP SERPL CALCULATED.3IONS-SCNC: 8 MMOL/L (ref 3–14)
APPEARANCE UR: ABNORMAL
AST SERPL W P-5'-P-CCNC: 18 U/L (ref 13–39)
BACTERIA #/AREA URNS HPF: ABNORMAL /HPF
BASOPHILS # BLD AUTO: 0.1 10E9/L (ref 0–0.2)
BASOPHILS NFR BLD AUTO: 0.7 %
BILIRUB SERPL-MCNC: 0.3 MG/DL (ref 0.3–1)
BILIRUB UR QL STRIP: NEGATIVE
BUN SERPL-MCNC: 13 MG/DL (ref 7–25)
CALCIUM SERPL-MCNC: 9.3 MG/DL (ref 8.6–10.3)
CHLORIDE SERPL-SCNC: 100 MMOL/L (ref 98–107)
CO2 SERPL-SCNC: 28 MMOL/L (ref 21–31)
COLOR UR AUTO: ABNORMAL
CREAT SERPL-MCNC: 0.62 MG/DL (ref 0.6–1.2)
D DIMER PPP FEU-MCNC: 0.5 UG/ML FEU (ref 0–0.5)
DIFFERENTIAL METHOD BLD: NORMAL
EOSINOPHIL # BLD AUTO: 0 10E9/L (ref 0–0.7)
EOSINOPHIL NFR BLD AUTO: 0.4 %
ERYTHROCYTE [DISTWIDTH] IN BLOOD BY AUTOMATED COUNT: 13 % (ref 10–15)
GFR SERPL CREATININE-BSD FRML MDRD: >90 ML/MIN/{1.73_M2}
GLUCOSE SERPL-MCNC: 120 MG/DL (ref 70–105)
GLUCOSE UR STRIP-MCNC: NEGATIVE MG/DL
HCG UR QL: NEGATIVE
HCT VFR BLD AUTO: 43.5 % (ref 35–47)
HGB BLD-MCNC: 15.2 G/DL (ref 11.7–15.7)
HGB UR QL STRIP: ABNORMAL
IMM GRANULOCYTES # BLD: 0 10E9/L (ref 0–0.4)
IMM GRANULOCYTES NFR BLD: 0.4 %
KETONES UR STRIP-MCNC: NEGATIVE MG/DL
LEUKOCYTE ESTERASE UR QL STRIP: ABNORMAL
LIPASE SERPL-CCNC: 63 U/L (ref 11–82)
LYMPHOCYTES # BLD AUTO: 1.2 10E9/L (ref 0.8–5.3)
LYMPHOCYTES NFR BLD AUTO: 16 %
MCH RBC QN AUTO: 32.4 PG (ref 26.5–33)
MCHC RBC AUTO-ENTMCNC: 34.9 G/DL (ref 31.5–36.5)
MCV RBC AUTO: 93 FL (ref 78–100)
MONOCYTES # BLD AUTO: 0.5 10E9/L (ref 0–1.3)
MONOCYTES NFR BLD AUTO: 7.1 %
MUCOUS THREADS #/AREA URNS LPF: PRESENT /LPF
NEUTROPHILS # BLD AUTO: 5.6 10E9/L (ref 1.6–8.3)
NEUTROPHILS NFR BLD AUTO: 75.4 %
NITRATE UR QL: NEGATIVE
PH UR STRIP: 6 PH (ref 5–7)
PLATELET # BLD AUTO: 293 10E9/L (ref 150–450)
POTASSIUM SERPL-SCNC: 3.7 MMOL/L (ref 3.5–5.1)
PROT SERPL-MCNC: 7.4 G/DL (ref 6.4–8.9)
RBC # BLD AUTO: 4.69 10E12/L (ref 3.8–5.2)
RBC #/AREA URNS AUTO: 12 /HPF (ref 0–2)
SODIUM SERPL-SCNC: 136 MMOL/L (ref 134–144)
SOURCE: ABNORMAL
SP GR UR STRIP: 1.02 (ref 1–1.03)
SQUAMOUS #/AREA URNS AUTO: 13 /HPF (ref 0–1)
TROPONIN I SERPL-MCNC: 3.7 PG/ML
TROPONIN I SERPL-MCNC: 4.9 PG/ML
TSH SERPL DL<=0.05 MIU/L-ACNC: 1.54 IU/ML (ref 0.34–5.6)
UROBILINOGEN UR STRIP-MCNC: NORMAL MG/DL (ref 0–2)
WBC # BLD AUTO: 7.4 10E9/L (ref 4–11)
WBC #/AREA URNS AUTO: 152 /HPF (ref 0–5)

## 2021-03-25 PROCEDURE — 81001 URINALYSIS AUTO W/SCOPE: CPT | Performed by: STUDENT IN AN ORGANIZED HEALTH CARE EDUCATION/TRAINING PROGRAM

## 2021-03-25 PROCEDURE — 250N000011 HC RX IP 250 OP 636: Performed by: STUDENT IN AN ORGANIZED HEALTH CARE EDUCATION/TRAINING PROGRAM

## 2021-03-25 PROCEDURE — 36415 COLL VENOUS BLD VENIPUNCTURE: CPT | Mod: XU | Performed by: STUDENT IN AN ORGANIZED HEALTH CARE EDUCATION/TRAINING PROGRAM

## 2021-03-25 PROCEDURE — 85025 COMPLETE CBC W/AUTO DIFF WBC: CPT | Performed by: STUDENT IN AN ORGANIZED HEALTH CARE EDUCATION/TRAINING PROGRAM

## 2021-03-25 PROCEDURE — 85379 FIBRIN DEGRADATION QUANT: CPT | Performed by: STUDENT IN AN ORGANIZED HEALTH CARE EDUCATION/TRAINING PROGRAM

## 2021-03-25 PROCEDURE — 258N000003 HC RX IP 258 OP 636: Performed by: STUDENT IN AN ORGANIZED HEALTH CARE EDUCATION/TRAINING PROGRAM

## 2021-03-25 PROCEDURE — 84484 ASSAY OF TROPONIN QUANT: CPT | Performed by: STUDENT IN AN ORGANIZED HEALTH CARE EDUCATION/TRAINING PROGRAM

## 2021-03-25 PROCEDURE — 96360 HYDRATION IV INFUSION INIT: CPT | Performed by: STUDENT IN AN ORGANIZED HEALTH CARE EDUCATION/TRAINING PROGRAM

## 2021-03-25 PROCEDURE — 93005 ELECTROCARDIOGRAM TRACING: CPT | Performed by: STUDENT IN AN ORGANIZED HEALTH CARE EDUCATION/TRAINING PROGRAM

## 2021-03-25 PROCEDURE — 80053 COMPREHEN METABOLIC PANEL: CPT | Performed by: STUDENT IN AN ORGANIZED HEALTH CARE EDUCATION/TRAINING PROGRAM

## 2021-03-25 PROCEDURE — 71045 X-RAY EXAM CHEST 1 VIEW: CPT

## 2021-03-25 PROCEDURE — 84443 ASSAY THYROID STIM HORMONE: CPT | Performed by: STUDENT IN AN ORGANIZED HEALTH CARE EDUCATION/TRAINING PROGRAM

## 2021-03-25 PROCEDURE — 87086 URINE CULTURE/COLONY COUNT: CPT | Performed by: STUDENT IN AN ORGANIZED HEALTH CARE EDUCATION/TRAINING PROGRAM

## 2021-03-25 PROCEDURE — 93010 ELECTROCARDIOGRAM REPORT: CPT | Performed by: INTERNAL MEDICINE

## 2021-03-25 PROCEDURE — 99285 EMERGENCY DEPT VISIT HI MDM: CPT | Mod: 25 | Performed by: STUDENT IN AN ORGANIZED HEALTH CARE EDUCATION/TRAINING PROGRAM

## 2021-03-25 PROCEDURE — 83690 ASSAY OF LIPASE: CPT | Performed by: STUDENT IN AN ORGANIZED HEALTH CARE EDUCATION/TRAINING PROGRAM

## 2021-03-25 PROCEDURE — 81025 URINE PREGNANCY TEST: CPT | Performed by: STUDENT IN AN ORGANIZED HEALTH CARE EDUCATION/TRAINING PROGRAM

## 2021-03-25 PROCEDURE — 250N000013 HC RX MED GY IP 250 OP 250 PS 637: Performed by: STUDENT IN AN ORGANIZED HEALTH CARE EDUCATION/TRAINING PROGRAM

## 2021-03-25 PROCEDURE — 99284 EMERGENCY DEPT VISIT MOD MDM: CPT | Performed by: STUDENT IN AN ORGANIZED HEALTH CARE EDUCATION/TRAINING PROGRAM

## 2021-03-25 RX ORDER — ONDANSETRON 4 MG/1
4 TABLET, ORALLY DISINTEGRATING ORAL ONCE
Status: COMPLETED | OUTPATIENT
Start: 2021-03-25 | End: 2021-03-25

## 2021-03-25 RX ORDER — ACETAMINOPHEN 325 MG/1
975 TABLET ORAL ONCE
Status: COMPLETED | OUTPATIENT
Start: 2021-03-25 | End: 2021-03-25

## 2021-03-25 RX ORDER — CIPROFLOXACIN 500 MG/1
500 TABLET, FILM COATED ORAL 2 TIMES DAILY
Qty: 6 TABLET | Refills: 0 | Status: SHIPPED | OUTPATIENT
Start: 2021-03-25 | End: 2021-03-28

## 2021-03-25 RX ORDER — LORAZEPAM 1 MG/1
1 TABLET ORAL ONCE
Status: COMPLETED | OUTPATIENT
Start: 2021-03-25 | End: 2021-03-25

## 2021-03-25 RX ORDER — ONDANSETRON 4 MG/1
4 TABLET, ORALLY DISINTEGRATING ORAL EVERY 8 HOURS PRN
Qty: 10 TABLET | Refills: 0 | Status: SHIPPED | OUTPATIENT
Start: 2021-03-25

## 2021-03-25 RX ADMIN — LORAZEPAM 1 MG: 1 TABLET ORAL at 16:50

## 2021-03-25 RX ADMIN — ACETAMINOPHEN 975 MG: 325 TABLET ORAL at 18:28

## 2021-03-25 RX ADMIN — ONDANSETRON 4 MG: 4 TABLET, ORALLY DISINTEGRATING ORAL at 18:23

## 2021-03-25 RX ADMIN — SODIUM CHLORIDE 500 ML: 9 INJECTION, SOLUTION INTRAVENOUS at 18:21

## 2021-03-25 ASSESSMENT — PAIN SCALES - GENERAL: PAINLEVEL: EXTREME PAIN (8)

## 2021-03-25 ASSESSMENT — MIFFLIN-ST. JEOR
SCORE: 1235.97
SCORE: 1239.59

## 2021-03-25 NOTE — ED TRIAGE NOTES
"ED Nursing Triage Note (General)   ________________________________    Crystal Ferrara is a 50 year old Female that presents to triage private car  With history of  Chest pain that started this morning at 0845 but became severe at 1145.  Describes the pain as heavy and intermittent. Radiates up to her neck. Denies any personal heart hx.  Also reporting nausea that started 1.5 weeks ago.   reported by patient     BP (!) 147/99   Pulse 106   Temp 97.5  F (36.4  C) (Tympanic)   Resp 20   Ht 1.575 m (5' 2\")   Wt 65.8 kg (145 lb)   SpO2 100%   BMI 26.52 kg/m  t  Patient appears alert , in mild distress., and cooperative and calm behavior.    GCS Eye Opening = 4=Spontaneous  Airway: intact  Breathing noted as Normal.  Circulation Normal  Skin normal  Action taken:  Triage to critical care immediately      PRE HOSPITAL PRIOR LIVING SITUATION Other:  Boyfriend and children  "

## 2021-03-25 NOTE — ED NOTES
Chest pressure continues at this time.  Patient is watching tv with her S.O.   Moriah Vilchis RN on 3/25/2021 at 5:16 PM

## 2021-03-25 NOTE — DISCHARGE INSTRUCTIONS
No emergent life-threatening cause of your symptoms was discovered today after extensive testing.    You should follow up closely with your doctor in clinic within the next 3-5 days to discuss next steps in evaluation and treatment.    Avoid caffeine or other stimulants which might make your symptoms worse.    Take tylenol 1000 mg three times daily as needed for pain or discomfort, or for headache.    Take zofran as prescribed for nausea.    Take ciprofloxacin as prescribed for possible urine infection.    Drink plenty of fluids and get lots of rest.    Come back to the emergency department or call 911 if new weakness or numbness on one side of the body, vomiting and inability to eat or drink, fever greater than 101 degrees Fahrenheit, severe chest pain that does not take away with pain medication or rest, shortness of breath, or any other acute concerns.

## 2021-03-25 NOTE — NURSING NOTE
Patient presents today for high blood pressure concerns. Patient complains of chest pain, nausea, headache and feels like someone is sitting on her chest.    Medication Reconciliation Complete    Es Varghese LPN  3/25/2021 3:57 PM

## 2021-03-25 NOTE — ED PROVIDER NOTES
"  History     Chief Complaint   Patient presents with     Chest Pain     Nausea     HPI  Crystal Ferrara is a 50 year old female with history of anxiety, HTN, and pyelonephritis who presents with chest pain and nausea. She reports several weeks of intermittent chest pain nausea, worse today. No associated shortness of breath, pain not worse with deep breathing. She localizes pain toe her mid-chest, bilateral. No flank pain, dysuria, or urinary complaints. She reports nausea during this time as well but no vomiting, not eating or drinking as much as usual. She reports headache that is not progressively worsening but has been fairly persistent for the last several days. No new weakness or numbness/tingling in extremities, fevers or chills, back pain, leg swelling, h/o DVT/PE, cough or hemoptysis, recent surgery, or h/o malignancy. She does report feeling anxious, denies any specific stressors.    Allergies:  Allergies   Allergen Reactions     Bees Anaphylaxis     Paroxetine Anaphylaxis     Pt stated\"seratonin \" toxicity  Yolanda Orellana, SHERMAN 2/1/2019     Sumatriptan Hives     Hives, swelling, chest tightness     Amoxicillin Hives     Contrast Dye Swelling     Hydrochlorothiazide Blisters     Lisinopril Rash       Problem List:    Patient Active Problem List    Diagnosis Date Noted     Pyelonephritis 02/08/2019     Priority: Medium     Essential hypertension 03/05/2018     Priority: Medium     Anxiety 03/05/2018     Priority: Medium     Dysmenorrhea 06/12/2013     Priority: Medium     Heavy menses 05/01/2013     Priority: Medium     Iron deficiency anemia due to chronic blood loss 05/01/2013     Priority: Medium        Past Medical History:    Past Medical History:   Diagnosis Date     Excessive and frequent menstruation with regular cycle      Personal history of other medical treatment (CODE)      Personal history of other medical treatment (CODE)        Past Surgical History:    Past Surgical History:   Procedure " "Laterality Date     APPENDECTOMY OPEN      No Comments Provided     TONSILLECTOMY      No Comments Provided       Family History:    Family History   Problem Relation Age of Onset     Family History Negative Mother         Good Health     Hypertension Mother         Hypertension     Family History Negative Father         Good Health     Family History Negative Sister         Good Health     Family History Negative Son         Good Health     Family History Negative Daughter         Good Health     Breast Cancer Maternal Aunt         Cancer-breast     Breast Cancer Paternal Aunt         Cancer-breast       Social History:  Marital Status:  Single [1]  Social History     Tobacco Use     Smoking status: Former Smoker     Packs/day: 0.25     Years: 8.00     Pack years: 2.00     Types: Cigarettes     Quit date: 2016     Years since quittin.5     Smokeless tobacco: Never Used   Substance Use Topics     Alcohol use: Yes     Comment: Alcoholic Drinks/day: once weekly     Drug use: No     Comment: Drug use: No        Medications:    ciprofloxacin (CIPRO) 500 MG tablet  ondansetron (ZOFRAN-ODT) 4 MG ODT tab  acetaminophen (TYLENOL) 325 MG tablet  amLODIPine (NORVASC) 5 MG tablet  ibuprofen (ADVIL/MOTRIN) 200 MG tablet          Review of Systems  10-point ROS complete and negative other than as noted in HPI above.    Physical Exam   BP: (!) 147/99  Pulse: 106  Temp: 97.5  F (36.4  C)  Resp: 20  Height: 157.5 cm (5' 2\")  Weight: 65.8 kg (145 lb)  SpO2: 100 %      Physical Exam  Gen: lying in bed, appears anxious but in no acute distress, alert  HEENT: NC/AT, slightly dry mucous membranes, conjunctivae clear  CV: Sinus tachycardia, no murmurs, appears warm and well-perfused  Pulm: CTAB, normal respiratory effort  Abd: Soft, NT, ND  Skin: No rash or other lesions  MSK: No gross deformities or swelling  Neuro: A&O x4, no focal deficits, CN II-XII grossly intact, strength 5/5 in all extremities, SILT    ED Course     ED " Course as of Mar 26 0633   u Mar 25, 2021   1642 EKG reviewed, no STEMI or current of injury, normal sinus rhythm      1708 CBC unremarkable. CMP within normal limits. Initial troponin normal, will plan for 1 hour repeat. D-dimer within normal limits, low suspicion for dissection or PE, will follow-up chest x-ray to evaluate for widened mediastinum      1708 Lipase normal      1748 Chest x-ray final report:  IMPRESSION:  No acute cardiopulmonary disease.        1812 Delta troponin with no significant change      1816 Patient re-evaluated, chest pain has improved. Patient still a little nauseous, will give zofran and small fluid bolus (suspect component of dehydration to patient's general symptoms), still awaiting urine sample      1823 Patient requesting tylenol for headache, tylenol ordered      1856 UPT negative      1900 Urinalysis with large leukocyte esterase and 152 WBC's; will add-on urine culture and treat empirically pending urine culture        Procedures               Critical Care time:  none               Results for orders placed or performed during the hospital encounter of 03/25/21 (from the past 24 hour(s))   CBC with platelets differential   Result Value Ref Range    WBC 7.4 4.0 - 11.0 10e9/L    RBC Count 4.69 3.8 - 5.2 10e12/L    Hemoglobin 15.2 11.7 - 15.7 g/dL    Hematocrit 43.5 35.0 - 47.0 %    MCV 93 78 - 100 fl    MCH 32.4 26.5 - 33.0 pg    MCHC 34.9 31.5 - 36.5 g/dL    RDW 13.0 10.0 - 15.0 %    Platelet Count 293 150 - 450 10e9/L    Diff Method Automated Method     % Neutrophils 75.4 %    % Lymphocytes 16.0 %    % Monocytes 7.1 %    % Eosinophils 0.4 %    % Basophils 0.7 %    % Immature Granulocytes 0.4 %    Absolute Neutrophil 5.6 1.6 - 8.3 10e9/L    Absolute Lymphocytes 1.2 0.8 - 5.3 10e9/L    Absolute Monocytes 0.5 0.0 - 1.3 10e9/L    Absolute Eosinophils 0.0 0.0 - 0.7 10e9/L    Absolute Basophils 0.1 0.0 - 0.2 10e9/L    Abs Immature Granulocytes 0.0 0 - 0.4 10e9/L   Comprehensive  metabolic panel   Result Value Ref Range    Sodium 136 134 - 144 mmol/L    Potassium 3.7 3.5 - 5.1 mmol/L    Chloride 100 98 - 107 mmol/L    Carbon Dioxide 28 21 - 31 mmol/L    Anion Gap 8 3 - 14 mmol/L    Glucose 120 (H) 70 - 105 mg/dL    Urea Nitrogen 13 7 - 25 mg/dL    Creatinine 0.62 0.60 - 1.20 mg/dL    GFR Estimate >90 >60 mL/min/[1.73_m2]    GFR Estimate If Black >90 >60 mL/min/[1.73_m2]    Calcium 9.3 8.6 - 10.3 mg/dL    Bilirubin Total 0.3 0.3 - 1.0 mg/dL    Albumin 4.5 3.5 - 5.7 g/dL    Protein Total 7.4 6.4 - 8.9 g/dL    Alkaline Phosphatase 99 34 - 104 U/L    ALT 16 7 - 52 U/L    AST 18 13 - 39 U/L   Lipase   Result Value Ref Range    Lipase 63 11 - 82 U/L   Troponin GH   Result Value Ref Range    Troponin 3.7 <34.0 pg/mL   D dimer quantitative   Result Value Ref Range    D Dimer 0.5 0.0 - 0.50 ug/ml FEU   TSH Reflex GH   Result Value Ref Range    TSH Reflex 1.54 0.34 - 5.60 IU/mL   XR Chest Port 1 View    Narrative    PROCEDURE:  XR CHEST PORT 1 VW    HISTORY:  chest pain.     COMPARISON:  2018    FINDINGS:   The cardiac silhouette is normal in size. The pulmonary vasculature is  normal.  The lungs are clear. No pleural effusion or pneumothorax.      Impression    IMPRESSION:  No acute cardiopulmonary disease.      VIOLETA DAVILA MD   Troponin GH (now)   Result Value Ref Range    Troponin 4.9 <34.0 pg/mL   UA reflex to Microscopic   Result Value Ref Range    Color Urine Light Yellow     Appearance Urine Slightly Cloudy     Glucose Urine Negative NEG^Negative mg/dL    Bilirubin Urine Negative NEG^Negative    Ketones Urine Negative NEG^Negative mg/dL    Specific Gravity Urine 1.023 1.003 - 1.035    Blood Urine Small (A) NEG^Negative    pH Urine 6.0 5.0 - 7.0 pH    Protein Albumin Urine 10 (A) NEG^Negative mg/dL    Urobilinogen mg/dL Normal 0.0 - 2.0 mg/dL    Nitrite Urine Negative NEG^Negative    Leukocyte Esterase Urine Large (A) NEG^Negative    Source Midstream Urine     RBC Urine 12 (H) 0 - 2 /HPF     WBC Urine 152 (H) 0 - 5 /HPF    Bacteria Urine Few (A) NEG^Negative /HPF    Squamous Epithelial /HPF Urine 13 (H) 0 - 1 /HPF    Mucous Urine Present (A) NEG^Negative /LPF   HCG qualitative urine   Result Value Ref Range    HCG Qual Urine Negative NEG^Negative       Medications   LORazepam (ATIVAN) tablet 1 mg (1 mg Oral Given 3/25/21 1650)   ondansetron (ZOFRAN-ODT) ODT tab 4 mg (4 mg Oral Given 3/25/21 1823)   0.9% sodium chloride BOLUS (0 mLs Intravenous Stopped 3/25/21 1904)   acetaminophen (TYLENOL) tablet 975 mg (975 mg Oral Given 3/25/21 1828)       Assessments & Plan (with Medical Decision Making)   50-year-old woman with history of anxiety, HTN, and pyelonephritis who presents with chest pain and nausea, found to have no evidence of ACS on EKG or serial troponin testing, D-dimer within normal limits making PE unlikely with low pre-test probability, as well normal chest x-ray with normal mediastinum and atypical symptoms suggestive of dissection making this diagnosis unlikely as well. Vitally stable during ED course and afebrile. She was found to have a urinary tract infection, will treat empirically pending culture results with ciprofloxacin (due to multiple medication allergies). Her nausea improved with zofran, she was given a fluid bolus for suspect dehydration, and her chest pain and anxiety improved after ativan. I suspect anxiety driving some of patients symptoms, however she will need close outpatient follow up with PCP to consider stress testing and/or upper endoscopy. If symptoms persist and these tests are unrevealing could consider CT of the chest and abdomen. Patient appropriate for discharge with close outpatient follow-up, careful return precautions provided.    From ED discharge instructions:  No emergent life-threatening cause of your symptoms was discovered today after extensive testing.    You should follow up closely with your doctor in clinic within the next 3-5 days to discuss next steps  in evaluation and treatment.    Avoid caffeine or other stimulants which might make your symptoms worse.    Take tylenol 1000 mg three times daily as needed for pain or discomfort, or for headache.    Take zofran as prescribed for nausea.    Take ciprofloxacin as prescribed for possible urine infection.    Drink plenty of fluids and get lots of rest.    Come back to the emergency department or call 911 if new weakness or numbness on one side of the body, vomiting and inability to eat or drink, fever greater than 101 degrees Fahrenheit, severe chest pain that does not take away with pain medication or rest, shortness of breath, or any other acute concerns.      I have reviewed the nursing notes.    I have reviewed the findings, diagnosis, plan and need for follow up with the patient.       Discharge Medication List as of 3/25/2021  7:04 PM      START taking these medications    Details   ciprofloxacin (CIPRO) 500 MG tablet Take 1 tablet (500 mg) by mouth 2 times daily for 3 days, Disp-6 tablet, R-0, E-Prescribe             Final diagnoses:   Chest pain, unspecified type   Nonintractable headache, unspecified chronicity pattern, unspecified headache type   Anxiety   Dehydration   Nausea   Urinary tract infection without hematuria, site unspecified       3/25/2021   [facility] AND [facility]     WanZac Lira MD  03/27/21 9659

## 2021-03-27 LAB — INTERPRETATION ECG - MUSE: NORMAL

## 2021-03-28 LAB
BACTERIA SPEC CULT: NORMAL
SPECIMEN SOURCE: NORMAL

## 2021-03-28 NOTE — RESULT ENCOUNTER NOTE
Final urine culture report is NEGATIVE  No change in treatment per [facility] ED Lab Result Urine Culture protocol.

## 2021-03-31 ENCOUNTER — TELEPHONE (OUTPATIENT)
Dept: FAMILY MEDICINE | Facility: OTHER | Age: 51
End: 2021-03-31

## 2021-03-31 NOTE — TELEPHONE ENCOUNTER
Spoke with patient she wants a refill on the antibiotic she received in the ED for a UTI.  she has a cut from shaving her pubic region. I let her know there is no documentation I did refer to her being seen in the , [facility] or Urgent Care near her. She had multiple excuses not to be seen, new job, in between insurances, no time. I did let her know that PCP is out of the office but will check with the [facility]. Kiera Dong LPN .......................3/31/2021  10:55 AM

## 2021-03-31 NOTE — TELEPHONE ENCOUNTER
Patient was in the ER the other day and she is out of her antibiotic they gave her.   She needs more.  Please call patient back.    Patient has an appointment coming with PCP next week.   Thank you   Jacqueline Enrique on 3/31/2021 at 9:17 AM

## 2021-05-18 ENCOUNTER — HOSPITAL ENCOUNTER (EMERGENCY)
Facility: OTHER | Age: 51
Discharge: HOME | End: 2021-05-18
Attending: PHYSICIAN ASSISTANT | Admitting: PHYSICIAN ASSISTANT

## 2021-05-18 VITALS
RESPIRATION RATE: 16 BRPM | BODY MASS INDEX: 25.97 KG/M2 | OXYGEN SATURATION: 97 % | DIASTOLIC BLOOD PRESSURE: 96 MMHG | WEIGHT: 142 LBS | HEART RATE: 108 BPM | SYSTOLIC BLOOD PRESSURE: 152 MMHG | TEMPERATURE: 99 F

## 2021-05-18 DIAGNOSIS — A59.01 TRICHOMONIASIS OF VAGINA: ICD-10-CM

## 2021-05-18 LAB
ALBUMIN UR-MCNC: 30 MG/DL
APPEARANCE UR: ABNORMAL
BACTERIA #/AREA URNS HPF: ABNORMAL /HPF
BILIRUB UR QL STRIP: NEGATIVE
COLOR UR AUTO: YELLOW
GLUCOSE UR STRIP-MCNC: NEGATIVE MG/DL
HGB UR QL STRIP: ABNORMAL
HYALINE CASTS #/AREA URNS LPF: 21 /LPF (ref 0–2)
KETONES UR STRIP-MCNC: 5 MG/DL
LEUKOCYTE ESTERASE UR QL STRIP: ABNORMAL
MUCOUS THREADS #/AREA URNS LPF: PRESENT /LPF
NITRATE UR QL: NEGATIVE
PH UR STRIP: 6 PH (ref 5–7)
RBC #/AREA URNS AUTO: 69 /HPF (ref 0–2)
SOURCE: ABNORMAL
SP GR UR STRIP: 1.03 (ref 1–1.03)
SPECIMEN SOURCE: ABNORMAL
SQUAMOUS #/AREA URNS AUTO: 4 /HPF (ref 0–1)
UROBILINOGEN UR STRIP-MCNC: NORMAL MG/DL (ref 0–2)
WBC #/AREA URNS AUTO: >182 /HPF (ref 0–5)
WBC CLUMPS #/AREA URNS HPF: PRESENT /HPF
WET PREP SPEC: ABNORMAL

## 2021-05-18 PROCEDURE — 87210 SMEAR WET MOUNT SALINE/INK: CPT | Performed by: PHYSICIAN ASSISTANT

## 2021-05-18 PROCEDURE — 250N000013 HC RX MED GY IP 250 OP 250 PS 637: Performed by: PHYSICIAN ASSISTANT

## 2021-05-18 PROCEDURE — 87591 N.GONORRHOEAE DNA AMP PROB: CPT | Performed by: PHYSICIAN ASSISTANT

## 2021-05-18 PROCEDURE — 99283 EMERGENCY DEPT VISIT LOW MDM: CPT | Performed by: PHYSICIAN ASSISTANT

## 2021-05-18 PROCEDURE — 81001 URINALYSIS AUTO W/SCOPE: CPT | Performed by: PHYSICIAN ASSISTANT

## 2021-05-18 PROCEDURE — 87491 CHLMYD TRACH DNA AMP PROBE: CPT | Performed by: PHYSICIAN ASSISTANT

## 2021-05-18 RX ORDER — METRONIDAZOLE 500 MG/1
500 TABLET ORAL 2 TIMES DAILY
Qty: 14 TABLET | Refills: 1 | Status: SHIPPED | OUTPATIENT
Start: 2021-05-18 | End: 2021-05-25

## 2021-05-18 RX ORDER — METRONIDAZOLE 500 MG/1
500 TABLET ORAL ONCE
Status: COMPLETED | OUTPATIENT
Start: 2021-05-18 | End: 2021-05-18

## 2021-05-18 RX ADMIN — METRONIDAZOLE 500 MG: 500 TABLET, FILM COATED ORAL at 23:13

## 2021-05-18 ASSESSMENT — ENCOUNTER SYMPTOMS
BRUISES/BLEEDS EASILY: 0
CONFUSION: 0
ADENOPATHY: 0
BACK PAIN: 0
FEVER: 0
WOUND: 0
ABDOMINAL PAIN: 0
SHORTNESS OF BREATH: 0
HEMATURIA: 0
CHILLS: 0
CHEST TIGHTNESS: 0

## 2021-05-19 LAB
C TRACH DNA SPEC QL NAA+PROBE: NOT DETECTED
N GONORRHOEA DNA SPEC QL NAA+PROBE: NOT DETECTED
SPECIMEN SOURCE: NORMAL

## 2021-05-19 NOTE — ED PROVIDER NOTES
"  History     Chief Complaint   Patient presents with     Vaginal Pain     HPI  Crystal Ferrara is a 51 year old female who presents to the ED for evaluation of vaginal pain. She reports history of vaginal pain, stated she cut herself shaving three weeks ago, it has been sore and painful since then. Pt stated that she was seen in [place] two weeks ago and was treated for yeast infection, pt was not physically assessed. Pt stated the pain and swelling has not gotten better since then. [facility] She denies abdominal pain, n/v, fevers. She has noticed some vaginal discharge.     Allergies:  Allergies   Allergen Reactions     Bees Anaphylaxis     Paroxetine Anaphylaxis     Pt stated\"seratonin \" toxicity  Yolanda Orellana LPN 2/1/2019     Sumatriptan Hives     Hives, swelling, chest tightness     Amoxicillin Hives     Contrast Dye Swelling     Hydrochlorothiazide Blisters     Lisinopril Rash       Problem List:    Patient Active Problem List    Diagnosis Date Noted     Pyelonephritis 02/08/2019     Priority: Medium     Essential hypertension 03/05/2018     Priority: Medium     Anxiety 03/05/2018     Priority: Medium     Dysmenorrhea 06/12/2013     Priority: Medium     Heavy menses 05/01/2013     Priority: Medium     Iron deficiency anemia due to chronic blood loss 05/01/2013     Priority: Medium        Past Medical History:    Past Medical History:   Diagnosis Date     Excessive and frequent menstruation with regular cycle      Personal history of other medical treatment (CODE)      Personal history of other medical treatment (CODE)        Past Surgical History:    Past Surgical History:   Procedure Laterality Date     APPENDECTOMY OPEN      No Comments Provided     TONSILLECTOMY      No Comments Provided       Family History:    Family History   Problem Relation Age of Onset     Family History Negative Mother         Good Health     Hypertension Mother         " Hypertension     Family History Negative Father         Good Health     Family History Negative Sister         Good Health     Family History Negative Son         Good Health     Family History Negative Daughter         Good Health     Breast Cancer Maternal Aunt         Cancer-breast     Breast Cancer Paternal Aunt         Cancer-breast       Social History:  Marital Status:  Single [1]  Social History     Tobacco Use     Smoking status: Former Smoker     Packs/day: 0.25     Years: 8.00     Pack years: 2.00     Types: Cigarettes     Quit date: 2016     Years since quittin.7     Smokeless tobacco: Never Used   Substance Use Topics     Alcohol use: Yes     Comment: Alcoholic Drinks/day: once weekly     Drug use: No     Comment: Drug use: No        Medications:    metroNIDAZOLE (FLAGYL) 500 MG tablet  acetaminophen (TYLENOL) 325 MG tablet  amLODIPine (NORVASC) 5 MG tablet  ibuprofen (ADVIL/MOTRIN) 200 MG tablet  ondansetron (ZOFRAN-ODT) 4 MG ODT tab          Review of Systems   Constitutional: Negative for chills and fever.   HENT: Negative for congestion.    Eyes: Negative for visual disturbance.   Respiratory: Negative for chest tightness and shortness of breath.    Cardiovascular: Negative for chest pain.   Gastrointestinal: Negative for abdominal pain.   Genitourinary: Positive for vaginal discharge and vaginal pain. Negative for hematuria.   Musculoskeletal: Negative for back pain.   Skin: Negative for rash and wound.   Neurological: Negative for syncope.   Hematological: Negative for adenopathy. Does not bruise/bleed easily.   Psychiatric/Behavioral: Negative for confusion.       Physical Exam   BP: (!) 152/96  Pulse: 108  Temp: 99  F (37.2  C)  Resp: 16  Weight: 64.4 kg (142 lb)  SpO2: 97 %      Physical Exam  Constitutional:       General: She is not in acute distress.     Appearance: She is well-developed. She is not diaphoretic.   HENT:      Head: Normocephalic and atraumatic.   Eyes:      General:  No scleral icterus.     Conjunctiva/sclera: Conjunctivae normal.   Neck:      Musculoskeletal: Neck supple.   Cardiovascular:      Rate and Rhythm: Normal rate and regular rhythm.   Pulmonary:      Effort: Pulmonary effort is normal.      Breath sounds: Normal breath sounds.   Abdominal:      Palpations: Abdomen is soft.      Tenderness: There is no abdominal tenderness.   Genitourinary:     Vagina: Vaginal discharge present.      Comments: Very erythematous and swollen vagina  Musculoskeletal:         General: No deformity.   Lymphadenopathy:      Cervical: No cervical adenopathy.   Skin:     General: Skin is warm and dry.      Findings: No rash.   Neurological:      Mental Status: She is alert and oriented to person, place, and time. Mental status is at baseline.   Psychiatric:         Mood and Affect: Mood normal.         Behavior: Behavior normal.         Thought Content: Thought content normal.         Judgment: Judgment normal.         ED Course        Procedures               Critical Care time:  none               Results for orders placed or performed during the hospital encounter of 05/18/21 (from the past 24 hour(s))   UA reflex to Microscopic   Result Value Ref Range    Color Urine Yellow     Appearance Urine Slightly Cloudy     Glucose Urine Negative NEG^Negative mg/dL    Bilirubin Urine Negative NEG^Negative    Ketones Urine 5 (A) NEG^Negative mg/dL    Specific Gravity Urine 1.028 1.003 - 1.035    Blood Urine Moderate (A) NEG^Negative    pH Urine 6.0 5.0 - 7.0 pH    Protein Albumin Urine 30 (A) NEG^Negative mg/dL    Urobilinogen mg/dL Normal 0.0 - 2.0 mg/dL    Nitrite Urine Negative NEG^Negative    Leukocyte Esterase Urine Large (A) NEG^Negative    Source Urine     RBC Urine 69 (H) 0 - 2 /HPF    WBC Urine >182 (H) 0 - 5 /HPF    WBC Clumps Present (A) NEG^Negative /HPF    Bacteria Urine Few (A) NEG^Negative /HPF    Squamous Epithelial /HPF Urine 4 (H) 0 - 1 /HPF    Mucous Urine Present (A) NEG^Negative  /LPF    Hyaline Casts 21 (H) 0 - 2 /LPF   Wet prep    Specimen: Vagina   Result Value Ref Range    Specimen Description Vagina     Wet Prep Trichomonas seen (A)     Wet Prep No clue cells seen     Wet Prep No yeast seen        Medications   metroNIDAZOLE (FLAGYL) tablet 500 mg (500 mg Oral Given 5/18/21 9732)       Assessments & Plan (with Medical Decision Making)   Pt nontoxic in NAD. Heart, lung, bowel sounds normal, abd soft, nontender to palpation, nondistended. VSS, afebrile.     She does have a Very erythematous and swollen vagina.     She is positive for trichomonas.     We will treat with flagyl and we a script is written for her partner, Tomás Rashid as well.     A referral is placed for her to f/u with OB/GYN for reassessment.     Strict return precautions are given to the pt, they will return if symptoms are worsening or concerning. The pt understands and agrees with the plan and they are discharged.     Jhoan Mccall PA-C            I have reviewed the nursing notes.    I have reviewed the findings, diagnosis, plan and need for follow up with the patient.       New Prescriptions    METRONIDAZOLE (FLAGYL) 500 MG TABLET    Take 1 tablet (500 mg) by mouth 2 times daily for 7 days       Final diagnoses:   Trichomoniasis of vagina       5/18/2021   [facility]     Jhoan Mccall PA  05/18/21 8574

## 2021-05-19 NOTE — DISCHARGE INSTRUCTIONS
Get plenty of fluids and rest.  You can take Tylenol and ibuprofen to help with discomfort.  Take your antibiotics as directed.  Your partner should be treated as well.  Referral was placed for you to follow-up with OB/GYN for reassessment to ensure that things are improving.  Return the ED if there are worsening or concerning symptoms.

## 2021-05-19 NOTE — ED TRIAGE NOTES
ED Nursing Triage Note (General)   ________________________________    Crystalguido Ferrara is a 51 year old Female that presents to triage private car  With history of vaginal pain, stated she cut herself shaving three weeks ago, it has been sore and painful since then. Pt stated that she was seen in [place] two weeks ago and was treated for yeast infection, pt was not physically assessed. Pt stated the pain and swelling has not gotten better since then. [facility].    BP (!) 152/96   Pulse 108   Temp 99  F (37.2  C) (Tympanic)   Resp 16   Wt 64.4 kg (142 lb)   SpO2 97%   BMI 25.97 kg/m  t  Patient appears alert , in mild distress., and cooperative behavior.    GCS Total = 15  Airway: intact  Breathing noted as Normal  Circulation Normal  Skin:  Normal        PRE HOSPITAL PRIOR LIVING SITUATION Significant Other and Children

## 2021-05-19 NOTE — RESULT ENCOUNTER NOTE
Final result for both N. Gonorrhoeae PCR and Chlamydia Trachomatis PCR are NEGATIVE.  No treatment or change in treatment per [facility] ED Lab Result N. Gonorrhea AND/OR Chlamydia T. protocol.

## 2023-02-19 NOTE — TELEPHONE ENCOUNTER
Left message to call back....................  4/10/2018   2:57 PM  Kathy Diego LPN...................4/10/2018  2:57 PM  
Patient called today and can't see Dr Bam rayo May.  And her head is numb for 4 days now to the touch.  The mouth sores seem to be getting better.   Lump in throat, not having trouble breathing.  Please call patient back.    Thank You Jacqueline Enrique on 4/10/2018 at 11:04 AM    
Spoke with Jacqueline, unit 1  and she will assist patient in scheduling soonest appointment with Dr. Palacios.     Kathy Diego LPN...................4/10/2018  3:20 PM  
Call bell

## (undated) RX ORDER — IBUPROFEN 200 MG
TABLET ORAL
Status: DISPENSED
Start: 2018-03-23

## (undated) RX ORDER — MORPHINE SULFATE 4 MG/ML
INJECTION, SOLUTION INTRAMUSCULAR; INTRAVENOUS
Status: DISPENSED
Start: 2019-02-07

## (undated) RX ORDER — CEFEPIME HYDROCHLORIDE 2 G/1
INJECTION, POWDER, FOR SOLUTION INTRAVENOUS
Status: DISPENSED
Start: 2019-02-07

## (undated) RX ORDER — METRONIDAZOLE 500 MG/1
TABLET ORAL
Status: DISPENSED
Start: 2021-05-18

## (undated) RX ORDER — DIPHENHYDRAMINE HCL 25 MG
CAPSULE ORAL
Status: DISPENSED
Start: 2018-03-23

## (undated) RX ORDER — SODIUM CHLORIDE 9 MG/ML
INJECTION, SOLUTION INTRAVENOUS
Status: DISPENSED
Start: 2019-02-07

## (undated) RX ORDER — SODIUM CHLORIDE 9 MG/ML
INJECTION, SOLUTION INTRAVENOUS
Status: DISPENSED
Start: 2021-03-25

## (undated) RX ORDER — VANCOMYCIN HYDROCHLORIDE 1 G/20ML
INJECTION, POWDER, LYOPHILIZED, FOR SOLUTION INTRAVENOUS
Status: DISPENSED
Start: 2019-02-07

## (undated) RX ORDER — KETOROLAC TROMETHAMINE 30 MG/ML
INJECTION, SOLUTION INTRAMUSCULAR; INTRAVENOUS
Status: DISPENSED
Start: 2019-02-07

## (undated) RX ORDER — LORAZEPAM 1 MG/1
TABLET ORAL
Status: DISPENSED
Start: 2021-03-25

## (undated) RX ORDER — IBUPROFEN 400 MG/1
TABLET, FILM COATED ORAL
Status: DISPENSED
Start: 2018-03-23

## (undated) RX ORDER — ACETAMINOPHEN 325 MG/1
TABLET ORAL
Status: DISPENSED
Start: 2021-03-25

## (undated) RX ORDER — FENTANYL CITRATE 50 UG/ML
INJECTION, SOLUTION INTRAMUSCULAR; INTRAVENOUS
Status: DISPENSED
Start: 2019-02-07

## (undated) RX ORDER — ACETAMINOPHEN 500 MG
TABLET ORAL
Status: DISPENSED
Start: 2019-02-07

## (undated) RX ORDER — CEFTRIAXONE SODIUM 1 G/50ML
INJECTION, SOLUTION INTRAVENOUS
Status: DISPENSED
Start: 2019-02-07

## (undated) RX ORDER — ONDANSETRON 4 MG/1
TABLET, ORALLY DISINTEGRATING ORAL
Status: DISPENSED
Start: 2021-03-25